# Patient Record
Sex: FEMALE | Race: WHITE | NOT HISPANIC OR LATINO | Employment: FULL TIME | URBAN - METROPOLITAN AREA
[De-identification: names, ages, dates, MRNs, and addresses within clinical notes are randomized per-mention and may not be internally consistent; named-entity substitution may affect disease eponyms.]

---

## 2023-09-07 PROBLEM — R06.02 SHORTNESS OF BREATH: Status: ACTIVE | Noted: 2023-09-07

## 2023-09-07 PROBLEM — I25.10 CORONARY ARTERIOSCLEROSIS: Status: ACTIVE | Noted: 2023-09-07

## 2023-09-07 PROBLEM — R53.81 PHYSICAL DECONDITIONING: Status: ACTIVE | Noted: 2023-09-07

## 2023-09-07 PROBLEM — I50.32 CHRONIC DIASTOLIC CONGESTIVE HEART FAILURE (MULTI): Status: ACTIVE | Noted: 2023-09-07

## 2023-09-07 PROBLEM — I50.33 ACUTE ON CHRONIC HEART FAILURE WITH PRESERVED EJECTION FRACTION (MULTI): Status: ACTIVE | Noted: 2023-09-07

## 2023-09-07 PROBLEM — R60.0 LOWER EXTREMITY EDEMA: Status: ACTIVE | Noted: 2023-09-07

## 2023-09-07 PROBLEM — I27.81 COR PULMONALE (MULTI): Status: ACTIVE | Noted: 2023-09-07

## 2023-09-07 PROBLEM — F10.10 ALCOHOL ABUSE: Status: ACTIVE | Noted: 2023-09-07

## 2023-09-07 PROBLEM — J96.11 CHRONIC RESPIRATORY FAILURE WITH HYPOXIA AND HYPERCAPNIA (MULTI): Status: ACTIVE | Noted: 2023-09-07

## 2023-09-07 PROBLEM — D64.9 ANEMIA: Status: ACTIVE | Noted: 2023-09-07

## 2023-09-07 PROBLEM — R60.0 PERIORBITAL EDEMA: Status: ACTIVE | Noted: 2023-09-07

## 2023-09-07 PROBLEM — J44.1 COPD EXACERBATION (MULTI): Status: ACTIVE | Noted: 2023-09-07

## 2023-09-07 PROBLEM — D75.89 MACROCYTOSIS WITHOUT ANEMIA: Status: ACTIVE | Noted: 2023-09-07

## 2023-09-07 PROBLEM — D69.6 THROMBOCYTOPENIA (CMS-HCC): Status: ACTIVE | Noted: 2023-09-07

## 2023-09-07 PROBLEM — J20.9 ACUTE BRONCHITIS WITH BRONCHOSPASM: Status: ACTIVE | Noted: 2023-09-07

## 2023-09-07 PROBLEM — R25.1 TREMOR, UNSPECIFIED: Status: ACTIVE | Noted: 2023-09-07

## 2023-09-07 PROBLEM — I27.20 PULMONARY HYPERTENSION, MILD (MULTI): Status: ACTIVE | Noted: 2023-09-07

## 2023-09-07 PROBLEM — E78.5 HYPERLIPIDEMIA: Status: ACTIVE | Noted: 2023-09-07

## 2023-09-07 PROBLEM — R09.89 LABILE HYPERTENSION: Status: ACTIVE | Noted: 2023-09-07

## 2023-09-07 PROBLEM — J96.12 CHRONIC RESPIRATORY FAILURE WITH HYPOXIA AND HYPERCAPNIA (MULTI): Status: ACTIVE | Noted: 2023-09-07

## 2023-09-07 RX ORDER — ACETAMINOPHEN 325 MG/1
TABLET ORAL EVERY 4 HOURS PRN
COMMUNITY
Start: 2019-05-14 | End: 2023-11-21 | Stop reason: ALTCHOICE

## 2023-09-07 RX ORDER — ATORVASTATIN CALCIUM 40 MG/1
TABLET, FILM COATED ORAL
COMMUNITY
Start: 2020-02-13 | End: 2023-10-24 | Stop reason: SDUPTHER

## 2023-09-07 RX ORDER — UMECLIDINIUM BROMIDE AND VILANTEROL TRIFENATATE 62.5; 25 UG/1; UG/1
1 POWDER RESPIRATORY (INHALATION) DAILY
COMMUNITY
Start: 2021-01-07 | End: 2023-11-13 | Stop reason: SDUPTHER

## 2023-09-07 RX ORDER — CARVEDILOL 6.25 MG/1
1 TABLET ORAL 2 TIMES DAILY
COMMUNITY
Start: 2020-02-05 | End: 2023-12-11 | Stop reason: SDUPTHER

## 2023-09-07 RX ORDER — IBUPROFEN 200 MG
TABLET ORAL
COMMUNITY
End: 2023-11-21 | Stop reason: ALTCHOICE

## 2023-09-07 RX ORDER — FERROUS SULFATE 324(65)MG
65 TABLET, DELAYED RELEASE (ENTERIC COATED) ORAL
COMMUNITY
Start: 2023-03-24

## 2023-09-07 RX ORDER — ALBUTEROL SULFATE 90 UG/1
2 AEROSOL, METERED RESPIRATORY (INHALATION) EVERY 4 HOURS PRN
COMMUNITY
Start: 2019-04-05 | End: 2024-05-24 | Stop reason: SDUPTHER

## 2023-10-11 ENCOUNTER — APPOINTMENT (OUTPATIENT)
Dept: CARDIOLOGY | Facility: HOSPITAL | Age: 63
End: 2023-10-11
Payer: COMMERCIAL

## 2023-10-24 DIAGNOSIS — E78.5 HYPERLIPIDEMIA, UNSPECIFIED HYPERLIPIDEMIA TYPE: Primary | ICD-10-CM

## 2023-10-24 RX ORDER — ATORVASTATIN CALCIUM 40 MG/1
40 TABLET, FILM COATED ORAL DAILY
Qty: 90 TABLET | Refills: 3 | Status: SHIPPED | OUTPATIENT
Start: 2023-10-24

## 2023-11-13 ENCOUNTER — OFFICE VISIT (OUTPATIENT)
Dept: PULMONOLOGY | Facility: CLINIC | Age: 63
End: 2023-11-13
Payer: COMMERCIAL

## 2023-11-13 VITALS
TEMPERATURE: 97 F | DIASTOLIC BLOOD PRESSURE: 71 MMHG | RESPIRATION RATE: 18 BRPM | OXYGEN SATURATION: 97 % | HEART RATE: 78 BPM | BODY MASS INDEX: 24.27 KG/M2 | WEIGHT: 151 LBS | HEIGHT: 66 IN | SYSTOLIC BLOOD PRESSURE: 123 MMHG

## 2023-11-13 DIAGNOSIS — J96.11 CHRONIC RESPIRATORY FAILURE WITH HYPOXIA AND HYPERCAPNIA (MULTI): Primary | ICD-10-CM

## 2023-11-13 DIAGNOSIS — I27.20 PULMONARY HYPERTENSION, MILD (MULTI): ICD-10-CM

## 2023-11-13 DIAGNOSIS — F17.219 CIGARETTE NICOTINE DEPENDENCE WITH NICOTINE-INDUCED DISORDER: ICD-10-CM

## 2023-11-13 DIAGNOSIS — J44.1 COPD EXACERBATION (MULTI): ICD-10-CM

## 2023-11-13 DIAGNOSIS — J96.12 CHRONIC RESPIRATORY FAILURE WITH HYPOXIA AND HYPERCAPNIA (MULTI): Primary | ICD-10-CM

## 2023-11-13 PROCEDURE — 3078F DIAST BP <80 MM HG: CPT | Performed by: INTERNAL MEDICINE

## 2023-11-13 PROCEDURE — 99214 OFFICE O/P EST MOD 30 MIN: CPT | Performed by: INTERNAL MEDICINE

## 2023-11-13 PROCEDURE — 3074F SYST BP LT 130 MM HG: CPT | Performed by: INTERNAL MEDICINE

## 2023-11-13 PROCEDURE — 1036F TOBACCO NON-USER: CPT | Performed by: INTERNAL MEDICINE

## 2023-11-13 RX ORDER — UMECLIDINIUM BROMIDE AND VILANTEROL TRIFENATATE 62.5; 25 UG/1; UG/1
1 POWDER RESPIRATORY (INHALATION) DAILY
Qty: 30 EACH | Refills: 11 | Status: SHIPPED | OUTPATIENT
Start: 2023-11-13 | End: 2024-05-24 | Stop reason: SDUPTHER

## 2023-11-13 ASSESSMENT — ENCOUNTER SYMPTOMS
FREQUENCY: 0
FATIGUE: 1
SHORTNESS OF BREATH: 1
COUGH: 1
EYE PAIN: 0
SINUS PAIN: 0
UNEXPECTED WEIGHT CHANGE: 0
DIARRHEA: 0
PALPITATIONS: 0
FEVER: 0
ABDOMINAL PAIN: 0
EYE REDNESS: 0
VOMITING: 0
NERVOUS/ANXIOUS: 0
BACK PAIN: 0
RHINORRHEA: 1
HEMATURIA: 0
NAUSEA: 0
EYE ITCHING: 0
DIZZINESS: 0
CONSTIPATION: 1
CONFUSION: 0
NECK PAIN: 0
ARTHRALGIAS: 0
APPETITE CHANGE: 0
DYSPHORIC MOOD: 0
LIGHT-HEADEDNESS: 0
SORE THROAT: 0
WOUND: 0
SEIZURES: 0
HEADACHES: 0
DYSURIA: 0
CHILLS: 0
MYALGIAS: 0
CHEST TIGHTNESS: 0
SINUS PRESSURE: 0

## 2023-11-13 ASSESSMENT — PAIN SCALES - GENERAL: PAINLEVEL: 0-NO PAIN

## 2023-11-13 NOTE — PROGRESS NOTES
Subjective   Patient ID: Ilsa Cosby is a 63 y.o. female who presents for Follow-up and COPD.  HPI  63 YOF with h/o COPD and CHF being seen for COPD management.   Last seen 8 months ago, no major events since then.   Overall feels the same as the last visit.   In the past, stated her employer is not allowing her to use her portable O2 at work due to safety issues. As a result is experiencing significant desaturation at work. But now she is not working. She at times increases her supplemental O2 to 2-4 LPM due to low O2 saturations.   Denies SOB at rest. ARECHIGA after walking 100 feet, or climbing 1 FOS. +ve rare orthopnea. Occasionally +ve PND. No LE edema. ARECHIGA gradual onset, over years, progressive, but now stable. Denies associated CP, wheezing.   Very rare wheezing.  +ve chronic cough, mild, productive of clear sputum. No h/o hemoptysis. Cough and sputum now improved/stable     +ve snoring, no witness apnea. Does not feels rested in am.     Exposures: quit smoking in 10/2023, 1/20 PPD (cutting back) 1 pack/day x 30 years h/o smoking. No other exposures.      Review of Systems   Constitutional:  Positive for fatigue. Negative for appetite change, chills, fever and unexpected weight change.   HENT:  Positive for congestion, postnasal drip and rhinorrhea. Negative for sinus pressure, sinus pain and sore throat.    Eyes:  Negative for pain, redness, itching and visual disturbance.   Respiratory:  Positive for cough and shortness of breath. Negative for chest tightness.    Cardiovascular:  Negative for chest pain, palpitations and leg swelling.   Gastrointestinal:  Positive for constipation. Negative for abdominal pain, diarrhea, nausea and vomiting.   Genitourinary:  Negative for dysuria, frequency and hematuria.   Musculoskeletal:  Negative for arthralgias, back pain, myalgias and neck pain.   Skin:  Negative for pallor, rash and wound.   Neurological:  Negative for dizziness, seizures, syncope, light-headedness and  headaches.   Psychiatric/Behavioral:  Negative for confusion and dysphoric mood. The patient is not nervous/anxious.        Objective   Physical Exam  Constitutional:       General: She is not in acute distress.     Appearance: Normal appearance. She is normal weight. She is not ill-appearing.   HENT:      Head: Normocephalic and atraumatic.      Nose:      Comments: On NC.      Mouth/Throat:      Mouth: Mucous membranes are moist.      Comments: Mallampati 3.   Eyes:      General: No scleral icterus.     Extraocular Movements: Extraocular movements intact.      Pupils: Pupils are equal, round, and reactive to light.   Cardiovascular:      Rate and Rhythm: Normal rate and regular rhythm.      Heart sounds: Normal heart sounds. No murmur heard.     No friction rub. No gallop.   Pulmonary:      Effort: Pulmonary effort is normal. No respiratory distress.      Breath sounds: No wheezing or rales.      Comments: Clear lung fields b/l with fair/poor air entry.   Abdominal:      General: Bowel sounds are normal. There is no distension.      Palpations: Abdomen is soft.      Tenderness: There is no abdominal tenderness.   Musculoskeletal:         General: No swelling. Normal range of motion.      Cervical back: Neck supple. No rigidity or tenderness.      Right lower leg: No edema.      Left lower leg: No edema.   Lymphadenopathy:      Cervical: No cervical adenopathy.   Skin:     General: Skin is warm and dry.      Coloration: Skin is not jaundiced.   Neurological:      General: No focal deficit present.      Mental Status: She is alert and oriented to person, place, and time.      Cranial Nerves: No cranial nerve deficit.      Motor: No weakness.   Psychiatric:         Mood and Affect: Mood normal.         Behavior: Behavior normal.     Results/Data  CAT from today reviewed, today's score 19.   6 MWT result from 5/2023 reviewed. Walked 262 m (51%), required 4 L both at rest and with activity.      The followings reviewed  during the previous visits.  I personally reviewed the images for the chest X-ray from 6/9/2021, official read as below.   Echo result from 3/2021 reviewed that showed: HFpEF, normal RV and RVSF, RVSP 67. +ve PFO.   I personally reviewed the images for the chest X-ray from 11/2020 that showed small b/l pleural effusion.  Echo result from Sep 2019 reviewed that showed HFpEF, enlarged RV, normal RVSF.   I personally reviewed the images for the chest CT from 5/10/19 that showed b/l effusion, R>L  Echo from 5/9/ also reviewed that showed RVSP 40, severely dilated RV.   I personally interpreted the PFT from 4/2019 that showed FEV1/FVC 51%, FEV1 27%, RV/TLC 79%, decreased DLCO.   Assessment/Plan   62 YOF with recently diagnosed COPD, hypoxia, CHF now being seen for follow up.      1. COPD: GOLD 4, category B. CAT 19. No recent hospitalization. Symptoms improved with Anoro. Now slightly worse due to not having another for a month.    -continue Anoro   -Continue ProAir as needed, on average twice a week.    -Went to pulmonary rehab. Advised to continue the exercises.      2. Hypoxia: qualified for home O2. Currently on 4L. +ve PFO on Echo from 3/2021. Recent 6 MWT confirmed need for 4L   -advised to use her O2 all the time     3. Pulmonary HTN and core pulmonale: likely group 2 and 3 from L sided HF and hypoxia. Worsening RVSP, but RV normal size.    -Treat COPD and hypoxia as above   -CHF management as per cardiology     4. Smoking: quit a month ago.    -not interested to have screening yearly chest CT done.    -smoking cessation counseling done, total time 4 min   -continue Nicotine products if needed     RTC in 6 months.

## 2023-11-13 NOTE — PATIENT INSTRUCTIONS
Ms Cosby,      It was a pleasure to see you in the clinic today. We discussed the followings:     1. COPD: your breathing test showed that you have a very severe case of COPD. Your symptoms seems to have improved with Anoro. Please continue Anoro and ProAir as needed.      2. Hypoxia: please continue to use your oxygen at all times.     3. Smoking: please make every effort to stop smoking.      Please return to the clinic in 6 months.

## 2023-11-17 PROBLEM — S32.423A: Status: ACTIVE | Noted: 2023-01-25

## 2023-11-17 PROBLEM — N39.0 URINARY TRACT INFECTION, SITE NOT SPECIFIED: Status: ACTIVE | Noted: 2023-01-31

## 2023-11-17 PROBLEM — M89.8X8 OTHER SPECIFIED DISORDERS OF BONE, OTHER SITE: Status: ACTIVE | Noted: 2023-01-31

## 2023-11-17 PROBLEM — S52.501A CLOSED FRACTURE OF DISTAL END OF RIGHT RADIUS: Status: ACTIVE | Noted: 2023-01-31

## 2023-11-17 PROBLEM — S32.591A PUBIC RAMUS FRACTURE, RIGHT, CLOSED, INITIAL ENCOUNTER (MULTI): Status: ACTIVE | Noted: 2023-01-31

## 2023-11-17 PROBLEM — I50.9 HEART FAILURE, UNSPECIFIED (MULTI): Status: ACTIVE | Noted: 2023-01-31

## 2023-11-17 PROBLEM — I10 ESSENTIAL (PRIMARY) HYPERTENSION: Status: ACTIVE | Noted: 2023-01-31

## 2023-11-17 PROBLEM — S32.591D: Status: ACTIVE | Noted: 2023-01-31
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
## 2023-11-17 PROBLEM — S52.611D: Status: ACTIVE | Noted: 2023-01-31

## 2023-11-17 PROBLEM — S32.401D: Status: ACTIVE | Noted: 2023-01-31

## 2023-11-17 PROBLEM — K59.00 CONSTIPATION, UNSPECIFIED: Status: ACTIVE | Noted: 2023-01-31

## 2023-11-20 ENCOUNTER — APPOINTMENT (OUTPATIENT)
Dept: PULMONOLOGY | Facility: CLINIC | Age: 63
End: 2023-11-20
Payer: COMMERCIAL

## 2023-11-21 ENCOUNTER — OFFICE VISIT (OUTPATIENT)
Dept: CARDIOLOGY | Facility: HOSPITAL | Age: 63
End: 2023-11-21
Payer: COMMERCIAL

## 2023-11-21 VITALS
DIASTOLIC BLOOD PRESSURE: 71 MMHG | WEIGHT: 150 LBS | HEIGHT: 66 IN | SYSTOLIC BLOOD PRESSURE: 125 MMHG | BODY MASS INDEX: 24.11 KG/M2 | OXYGEN SATURATION: 96 % | HEART RATE: 75 BPM

## 2023-11-21 DIAGNOSIS — R06.09 DYSPNEA ON EXERTION: ICD-10-CM

## 2023-11-21 DIAGNOSIS — E78.5 HYPERLIPIDEMIA, UNSPECIFIED HYPERLIPIDEMIA TYPE: ICD-10-CM

## 2023-11-21 DIAGNOSIS — I25.10 CORONARY ARTERY DISEASE, UNSPECIFIED VESSEL OR LESION TYPE, UNSPECIFIED WHETHER ANGINA PRESENT, UNSPECIFIED WHETHER NATIVE OR TRANSPLANTED HEART: Primary | ICD-10-CM

## 2023-11-21 PROCEDURE — 3074F SYST BP LT 130 MM HG: CPT | Performed by: NURSE PRACTITIONER

## 2023-11-21 PROCEDURE — 3078F DIAST BP <80 MM HG: CPT | Performed by: NURSE PRACTITIONER

## 2023-11-21 PROCEDURE — 93010 ELECTROCARDIOGRAM REPORT: CPT | Performed by: INTERNAL MEDICINE

## 2023-11-21 PROCEDURE — 99214 OFFICE O/P EST MOD 30 MIN: CPT | Performed by: NURSE PRACTITIONER

## 2023-11-21 PROCEDURE — 93005 ELECTROCARDIOGRAM TRACING: CPT | Performed by: NURSE PRACTITIONER

## 2023-11-21 PROCEDURE — 1036F TOBACCO NON-USER: CPT | Performed by: NURSE PRACTITIONER

## 2023-11-21 NOTE — PROGRESS NOTES
Primary Care Physician: Karlene Valenzuela MD  Date of Visit: 11/21/2023 10:30 AM EST  Location of visit: [facility]     Chief Complaint:   Chief Complaint   Patient presents with    Heart Failure    Hyperlipidemia    Coronary Artery Disease    Follow-up        HPI / Summary:   Ilsa Cosby is a 63 y.o. female presents for followup. Seen in collaboration with Dr. Barros.   Her chronic dyspnea on exertion has been worse since March. She has not been physically active. She is able to ambulate up and down one flight of stairs without chest pain. She wear supplemental oxygen 4 liters nasal cannula all the time. She continues to not smoke.   The patient denies chest pain, palpitations, lightheadedness, syncope, orthopnea, paroxysmal nocturnal dyspnea, lower extremity edema, or bleeding problems.              Past Medical History:  Past Medical History:   Diagnosis Date    Acute on chronic diastolic (congestive) heart failure (CMS/HCC) 05/23/2019    Acute on chronic heart failure with preserved ejection fraction    Chronic obstructive pulmonary disease with (acute) exacerbation (CMS/[facility]) 11/24/2020    COPD exacerbation    Encounter for screening for other viral diseases 03/05/2019    Need for hepatitis C screening test    Localized edema 05/08/2019    Lower extremity edema    Localized edema 03/05/2019    Periorbital edema    Other malaise 11/18/2021    Physical deconditioning    Personal history of other diseases of the circulatory system 06/03/2019    History of congestive heart failure    Personal history of other diseases of the nervous system and sense organs 03/05/2019    History of tremor    Personal history of other diseases of the respiratory system 06/03/2019    History of chronic obstructive lung disease    Personal history of other diseases of the respiratory system 08/14/2020    History of acute bronchitis with bronchospasm        Past Surgical History:  Past Surgical History:   Procedure Laterality Date     "OTHER SURGICAL HISTORY  06/03/2019    No history of surgery          Social History:   reports that she has quit smoking. Her smoking use included cigarettes. She has never used smokeless tobacco. She reports current alcohol use of about 2.0 standard drinks of alcohol per week. She reports that she does not use drugs.     Family History:  family history includes Diabetes in her paternal grandfather; Myocardial infarction in her brother and father; Stroke in her maternal grandmother, mother's brother, and mother's sister; cardiac disorder in her father.      Allergies:  No Known Allergies    Outpatient Medications:  Current Outpatient Medications   Medication Instructions    albuterol 90 mcg/actuation inhaler 2 puffs, inhalation, Every 4 hours PRN    atorvastatin (LIPITOR) 40 mg, oral, Daily    carvedilol (Coreg) 6.25 mg tablet 1 tablet, oral, 2 times daily    ferrous sulfate 65 mg, oral, Daily with breakfast    umeclidinium-vilanteroL (Anoro Ellipta) 62.5-25 mcg/actuation blister with device 1 puff, inhalation, Daily       Physical Exam:  Vitals:    11/21/23 1030   BP: 125/71   BP Location: Left arm   Patient Position: Sitting   BP Cuff Size: Small adult   Pulse: 75   SpO2: 96%   Weight: 68 kg (150 lb)   Height: 1.676 m (5' 6\")     Wt Readings from Last 5 Encounters:   11/21/23 68 kg (150 lb)   11/13/23 68.5 kg (151 lb)   03/24/23 63 kg (139 lb)   01/12/23 64 kg (141 lb 2 oz)   10/13/22 61.2 kg (135 lb 0.4 oz)     Body mass index is 24.21 kg/m².     GENERAL: alert, cooperative, pleasant, in no acute distress  SKIN: warm and dry  NECK: Normal JVD, negative HJR  CARDIAC: Regular rate and rhythm with no rubs, murmurs, or gallops  CHEST: Normal respiratory efforts, except diminished lung sounds posteriorly throughout all lung fields.  ABDOMEN: soft, nontender, nondistended  EXTREMITIES: no edema, +2 palpable RP bilaterally       Last Labs:  Recent Labs     01/12/23  1524 01/11/23  1319 06/09/21  1751   WBC 7.3 7.8 5.8 "   HGB 10.0* 10.2* 13.6   HCT 32.1* 32.0* 43.1    232 126*   * 110* 112*     Recent Labs     01/11/23  1319 06/09/21  1751 11/04/20  0317 02/05/20  1356    142 139 140   K 4.8 4.1 4.9 4.0   CL 97* 99 99 103   BUN 25* 16  --  14   CREATININE 0.80 0.64 0.62 0.67     CMP -  Lab Results   Component Value Date    CALCIUM 9.9 01/11/2023    PHOS 4.7 05/10/2019    PROT 7.4 01/11/2023    ALBUMIN 4.5 01/11/2023    AST 23 01/11/2023    ALT 15 01/11/2023    ALKPHOS 83 01/11/2023    BILITOT 0.4 01/11/2023       LIPID PANEL -   Lab Results   Component Value Date    CHOL 166 01/11/2023    HDL 89.3 01/11/2023    LDLF 63 01/11/2023    TRIG 69 01/11/2023       Lab Results   Component Value Date     (H) 06/09/2021    HGBA1C 5.1 01/11/2023       Last Cardiology Tests:  ECG:  Obtained and reviewed EKG- normal sinus rhythm with occasional PVC, rightward axis HR 75    Echo:  Echo Results:  3/16/21  CONCLUSIONS:   1. The left ventricular systolic function is normal with a 65-70% estimated ejection fraction.   2. Spectral Doppler shows an impaired relaxation pattern of left ventricular diastolic filling.   3. Moderately elevated pulmonary artery pressure.   4. A bubble study using agitated saline was performed. Bubble study is positive.         Cath:  5/13/2019  CONCLUSIONS:   1. No significant CAD with minimal luminal irregularities in a left dominant system.   2. Mildly elevated right sided filling pressures.   3. Normal left sided filling pressures.   4. Mild pulmonary hypertension.   5. No evidence of intracardiac shunt.   6. Preserved cardiac index.   7. No aortic stenosis.             Assessment/Plan   Diagnoses and all orders for this visit:  Coronary artery disease, unspecified vessel or lesion type, unspecified whether angina present, unspecified whether native or transplanted heart  -     ECG 12 lead (Clinic Performed)  -     Referral to Primary Care; Future  Hyperlipidemia, unspecified hyperlipidemia  type  -     Referral to Primary Care; Future  Dyspnea on exertion  -     Transthoracic Echo (TTE) Complete; Future  -     XR chest 2 views; Future  -     Referral to Primary Care; Future     In summary Ms. Cosby is a pleasant 63 year-old female with a past medical history significant for mild coronary atherosclerosis with luminal irregularities on angiography, severe COPD with cor pulmonale and mild pulmonary hypertension on right heart catheterization, chronic tobacco use, chronic alcohol use, and a family history of premature coronary disease and sudden death. Her dyspnea on exertion is worse since March. She is euvolemic by clinical exam. Her blood pressure is controlled. I suspect her dyspnea on exertion is secondary to severe COPD. I did order a chest xray and echocardiogram for surveillance of LV function. She will continue current cardiovascular medications. She will follow up in 1 year. She will call sooner with questions or concerns.             Orders:  No orders of the defined types were placed in this encounter.     Followup Appts:  No future appointments.        ____________________________________________________________  Rebecca Valentin, APRN-CNP  [facility]  [facility]

## 2023-11-21 NOTE — PATIENT INSTRUCTIONS
Chest xray  Echo  Continue current cardiovascular medications  Follow up in 1 year  Call sooner if symptoms get worse

## 2023-12-03 LAB
ATRIAL RATE: 75 BPM
P AXIS: 85 DEGREES
P OFFSET: 210 MS
P ONSET: 160 MS
PR INTERVAL: 120 MS
Q ONSET: 220 MS
QRS COUNT: 12 BEATS
QRS DURATION: 84 MS
QT INTERVAL: 386 MS
QTC CALCULATION(BAZETT): 431 MS
QTC FREDERICIA: 415 MS
R AXIS: 90 DEGREES
T AXIS: 72 DEGREES
T OFFSET: 413 MS
VENTRICULAR RATE: 75 BPM

## 2023-12-04 ENCOUNTER — APPOINTMENT (OUTPATIENT)
Dept: CARDIOLOGY | Facility: HOSPITAL | Age: 63
End: 2023-12-04
Payer: COMMERCIAL

## 2023-12-11 DIAGNOSIS — R09.89 LABILE HYPERTENSION: Primary | ICD-10-CM

## 2023-12-11 RX ORDER — CARVEDILOL 6.25 MG/1
6.25 TABLET ORAL 2 TIMES DAILY
Qty: 180 TABLET | Refills: 3 | Status: SHIPPED | OUTPATIENT
Start: 2023-12-11 | End: 2024-04-22

## 2024-01-18 ENCOUNTER — HOSPITAL ENCOUNTER (OUTPATIENT)
Dept: CARDIOLOGY | Facility: HOSPITAL | Age: 64
Discharge: HOME | End: 2024-01-18
Payer: COMMERCIAL

## 2024-01-18 DIAGNOSIS — R06.00 DYSPNEA, UNSPECIFIED: ICD-10-CM

## 2024-01-18 DIAGNOSIS — R06.09 DYSPNEA ON EXERTION: ICD-10-CM

## 2024-01-18 LAB
AORTIC VALVE PEAK VELOCITY: 1.54 M/S
AV PEAK GRADIENT: 9.5 MMHG
AVA (PEAK VEL): 2.19 CM2
EJECTION FRACTION APICAL 4 CHAMBER: 62.7
EJECTION FRACTION: 55 %
LEFT ATRIUM VOLUME AREA LENGTH INDEX BSA: 29.8 ML/M2
LEFT VENTRICLE INTERNAL DIMENSION DIASTOLE: 3.7 CM (ref 3.5–6)
LEFT VENTRICULAR OUTFLOW TRACT DIAMETER: 1.9 CM
MITRAL VALVE E/A RATIO: 1.03
RIGHT VENTRICLE PEAK SYSTOLIC PRESSURE: 56.9 MMHG
TRICUSPID ANNULAR PLANE SYSTOLIC EXCURSION: 2.2 CM

## 2024-01-18 PROCEDURE — 93306 TTE W/DOPPLER COMPLETE: CPT

## 2024-01-18 PROCEDURE — 93306 TTE W/DOPPLER COMPLETE: CPT | Performed by: INTERNAL MEDICINE

## 2024-01-30 NOTE — RESULT ENCOUNTER NOTE
Reviewed echocardiogram results. RVSP elevated. Recommended right heart cath. Patient would like to hold off for now. She will follow up in May or June and can re discuss.

## 2024-01-31 ENCOUNTER — APPOINTMENT (OUTPATIENT)
Dept: PRIMARY CARE | Facility: CLINIC | Age: 64
End: 2024-01-31
Payer: COMMERCIAL

## 2024-03-11 ENCOUNTER — APPOINTMENT (OUTPATIENT)
Dept: PRIMARY CARE | Facility: CLINIC | Age: 64
End: 2024-03-11
Payer: COMMERCIAL

## 2024-04-21 DIAGNOSIS — R09.89 LABILE HYPERTENSION: ICD-10-CM

## 2024-04-22 RX ORDER — CARVEDILOL 6.25 MG/1
6.25 TABLET ORAL 2 TIMES DAILY
Qty: 180 TABLET | Refills: 3 | Status: SHIPPED | OUTPATIENT
Start: 2024-04-22

## 2024-05-01 ENCOUNTER — OFFICE VISIT (OUTPATIENT)
Dept: PRIMARY CARE | Facility: CLINIC | Age: 64
End: 2024-05-01
Payer: COMMERCIAL

## 2024-05-01 DIAGNOSIS — Z12.31 ENCOUNTER FOR SCREENING MAMMOGRAM FOR MALIGNANT NEOPLASM OF BREAST: ICD-10-CM

## 2024-05-01 DIAGNOSIS — I27.20 PULMONARY HYPERTENSION, MILD (MULTI): ICD-10-CM

## 2024-05-01 DIAGNOSIS — Z00.00 PHYSICAL EXAM: Primary | ICD-10-CM

## 2024-05-01 DIAGNOSIS — Z23 NEED FOR VACCINATION FOR ZOSTER: ICD-10-CM

## 2024-05-01 DIAGNOSIS — J96.12 CHRONIC RESPIRATORY FAILURE WITH HYPOXIA AND HYPERCAPNIA (MULTI): ICD-10-CM

## 2024-05-01 DIAGNOSIS — J96.11 CHRONIC RESPIRATORY FAILURE WITH HYPOXIA AND HYPERCAPNIA (MULTI): ICD-10-CM

## 2024-05-01 PROCEDURE — 99396 PREV VISIT EST AGE 40-64: CPT | Performed by: INTERNAL MEDICINE

## 2024-05-01 PROCEDURE — 3075F SYST BP GE 130 - 139MM HG: CPT | Performed by: INTERNAL MEDICINE

## 2024-05-01 PROCEDURE — 90750 HZV VACC RECOMBINANT IM: CPT | Performed by: INTERNAL MEDICINE

## 2024-05-01 PROCEDURE — 3078F DIAST BP <80 MM HG: CPT | Performed by: INTERNAL MEDICINE

## 2024-05-01 PROCEDURE — 90471 IMMUNIZATION ADMIN: CPT | Performed by: INTERNAL MEDICINE

## 2024-05-02 VITALS
TEMPERATURE: 98 F | OXYGEN SATURATION: 92 % | SYSTOLIC BLOOD PRESSURE: 138 MMHG | WEIGHT: 159 LBS | HEART RATE: 86 BPM | HEIGHT: 66 IN | BODY MASS INDEX: 25.55 KG/M2 | DIASTOLIC BLOOD PRESSURE: 70 MMHG

## 2024-05-02 PROBLEM — J44.1 COPD EXACERBATION (MULTI): Status: RESOLVED | Noted: 2023-09-07 | Resolved: 2024-05-02

## 2024-05-02 NOTE — PROGRESS NOTES
"SUBJECTIVE:   Ilsa Cosby is a 63 y.o. female presenting for her annual physical/wellness.  PCP: Karlene Valenzuela MD    Colon screening: she would like to discuss after turning 64, when she has insurance that covers for tests.  Last pap: due. Need to see gyn  Last mammogram: due  Dexa she prefers to discuss these once she has better insurance, later this year  Vaccines due for shingrix  Diet:   healthy in general  Exercises: not much  Lab Results   Component Value Date    HGBA1C 5.1 01/11/2023     Lab Results   Component Value Date    CREATININE 0.80 01/11/2023     Lab Results   Component Value Date    WBC 7.3 01/12/2023    HGB 10.0 (L) 01/12/2023    HCT 32.1 (L) 01/12/2023     (H) 01/12/2023     01/12/2023     Lab Results   Component Value Date    CHOL 166 01/11/2023    CHOL 146 10/05/2021    CHOL 170 09/16/2020     Lab Results   Component Value Date    HDL 89.3 01/11/2023    HDL 66.1 10/05/2021    HDL 82.8 09/16/2020     No results found for: \"LDLCALC\"  Lab Results   Component Value Date    TRIG 69 01/11/2023    TRIG 63 10/05/2021    TRIG 65 09/16/2020     No components found for: \"CHOLHDL\"       ROS:   otherwise Feeling well. No dyspnea or chest pain on exertion. No abdominal pain, change in bowel habits, black or bloody stools. No urinary tract or  symptoms.  No breast concerns. No neurological complaints.    OBJECTIVE:   The patient appears well, alert, oriented x 3, in no distress.   /70   Pulse 86   Temp 36.7 °C (98 °F)   Ht 1.676 m (5' 6\")   Wt 72.1 kg (159 lb)   SpO2 92% Comment: 4 LITER OF O2  BMI 25.66 kg/m²   ENT normal.  Neck supple. No adenopathy or thyromegaly. VANESSA. Lungs are clear at this time. And somewhat decreased air entry, no wheezes, rhonchi or rales. S1 and S2 normal, no murmurs, regular rate and rhythm. Abdomen is soft without tenderness, guarding, mass or organomegaly.  exam deferred to Gyn. Extremities show no edema, normal peripheral pulses. Neurological is " normal without focal findings.      1. Physical exam  CBC, Comprehensive Metabolic Panel, Lipid Panel, Hemoglobin A1C      2. Encounter for screening mammogram for malignant neoplasm of breast  BI mammo bilateral screening tomosynthesis      3. Need for vaccination for zoster  Zoster vaccine, recombinant, adult (SHINGRIX)      4. Pulmonary hypertension, mild (Multi)        5. Chronic respiratory failure with hypoxia and hypercapnia (Multi)

## 2024-05-24 ENCOUNTER — APPOINTMENT (OUTPATIENT)
Dept: PULMONOLOGY | Facility: CLINIC | Age: 64
End: 2024-05-24
Payer: COMMERCIAL

## 2024-05-24 ENCOUNTER — OFFICE VISIT (OUTPATIENT)
Dept: PULMONOLOGY | Facility: CLINIC | Age: 64
End: 2024-05-24
Payer: COMMERCIAL

## 2024-05-24 VITALS
SYSTOLIC BLOOD PRESSURE: 129 MMHG | DIASTOLIC BLOOD PRESSURE: 71 MMHG | WEIGHT: 158.4 LBS | OXYGEN SATURATION: 91 % | BODY MASS INDEX: 25.46 KG/M2 | HEART RATE: 81 BPM | HEIGHT: 66 IN | TEMPERATURE: 97.1 F

## 2024-05-24 DIAGNOSIS — I27.20 PULMONARY HYPERTENSION, MILD (MULTI): ICD-10-CM

## 2024-05-24 DIAGNOSIS — J44.1 COPD EXACERBATION (MULTI): ICD-10-CM

## 2024-05-24 DIAGNOSIS — J96.12 CHRONIC RESPIRATORY FAILURE WITH HYPOXIA AND HYPERCAPNIA (MULTI): Primary | ICD-10-CM

## 2024-05-24 DIAGNOSIS — J96.11 CHRONIC RESPIRATORY FAILURE WITH HYPOXIA AND HYPERCAPNIA (MULTI): Primary | ICD-10-CM

## 2024-05-24 DIAGNOSIS — F17.219 CIGARETTE NICOTINE DEPENDENCE WITH NICOTINE-INDUCED DISORDER: ICD-10-CM

## 2024-05-24 PROCEDURE — 1036F TOBACCO NON-USER: CPT | Performed by: INTERNAL MEDICINE

## 2024-05-24 PROCEDURE — 3074F SYST BP LT 130 MM HG: CPT | Performed by: INTERNAL MEDICINE

## 2024-05-24 PROCEDURE — 99214 OFFICE O/P EST MOD 30 MIN: CPT | Performed by: INTERNAL MEDICINE

## 2024-05-24 PROCEDURE — 3078F DIAST BP <80 MM HG: CPT | Performed by: INTERNAL MEDICINE

## 2024-05-24 RX ORDER — ALBUTEROL SULFATE 90 UG/1
2 AEROSOL, METERED RESPIRATORY (INHALATION) EVERY 4 HOURS PRN
Qty: 18 G | Refills: 3 | Status: SHIPPED | OUTPATIENT
Start: 2024-05-24

## 2024-05-24 ASSESSMENT — ENCOUNTER SYMPTOMS
HEMATURIA: 0
VOMITING: 0
HEADACHES: 0
LIGHT-HEADEDNESS: 0
FREQUENCY: 1
ABDOMINAL PAIN: 0
EYE ITCHING: 0
SORE THROAT: 0
EYE PAIN: 0
EYE REDNESS: 0
FEVER: 0
DIARRHEA: 0
UNEXPECTED WEIGHT CHANGE: 1
SEIZURES: 0
CONSTIPATION: 0
BACK PAIN: 0
DIZZINESS: 0
WHEEZING: 1
CHILLS: 0
ARTHRALGIAS: 0
NAUSEA: 0
NERVOUS/ANXIOUS: 0
FATIGUE: 1
WOUND: 0
COUGH: 1
APPETITE CHANGE: 0
SINUS PRESSURE: 0
SHORTNESS OF BREATH: 1
NECK PAIN: 0
DYSURIA: 0
CHEST TIGHTNESS: 0
CONFUSION: 1
SINUS PAIN: 0
PALPITATIONS: 0
DYSPHORIC MOOD: 0
RHINORRHEA: 1
MYALGIAS: 0

## 2024-05-24 NOTE — PATIENT INSTRUCTIONS
Ms Cosby,      It was a pleasure to see you in the clinic today. We discussed the followings:     1. COPD: your breathing test showed that you have a very severe case of COPD. Your symptoms seems to not be well controlled. For that reason I am changing your Anoro to a new inhaler called Trelegy. Please continue albuterol as needed.      2. Hypoxia: please continue to use your oxygen at all times.     3. Smoking: please make every effort to stop smoking.      Please return to the clinic in 6 months.

## 2024-05-24 NOTE — PROGRESS NOTES
Subjective   Patient ID: Ilsa Cosby is a 63 y.o. female who presents for COPD.  HPI  Patient with h/o COPD and CHF being seen for COPD management.   Last seen 6 months ago, no major events since then.   Overall feels worse than the last visit.   In the past, stated her employer is not allowing her to use her portable O2 at work due to safety issues. As a result is experiencing significant desaturation at work. But now she is not working. Currently on 4L NC.   Denies SOB at rest. ARECHIGA after walking 60 feet, or climbing 1 FOS. +ve rare orthopnea. No PND. No LE edema. ARECHIGA gradual onset, over years, progressive, and worse than the last visit. Denies associated CP, wheezing.   Very rare wheezing.  +ve chronic cough, mild, productive of clear sputum. No h/o hemoptysis. Cough and sputum now improved/stable     +ve snoring, no witness apnea. Does not feels rested in am.     Exposures: quit smoking in 10/2023, 1/20 PPD (cutting back) 1 pack/day x 30 years h/o smoking. No other exposures.   Current Outpatient Medications   Medication Instructions    albuterol 90 mcg/actuation inhaler 2 puffs, inhalation, Every 4 hours PRN    atorvastatin (LIPITOR) 40 mg, oral, Daily    carvedilol (COREG) 6.25 mg, oral, 2 times daily    ferrous sulfate 65 mg, oral, Daily with breakfast    umeclidinium-vilanteroL (Anoro Ellipta) 62.5-25 mcg/actuation blister with device 1 puff, inhalation, Daily   Review of Systems   Constitutional:  Positive for fatigue and unexpected weight change (weight gain). Negative for appetite change, chills and fever.   HENT:  Positive for congestion, postnasal drip and rhinorrhea. Negative for sinus pressure, sinus pain and sore throat.    Eyes:  Negative for pain, redness, itching and visual disturbance.   Respiratory:  Positive for cough, shortness of breath and wheezing. Negative for chest tightness.    Cardiovascular:  Negative for chest pain, palpitations and leg swelling.   Gastrointestinal:  Negative for  abdominal pain, constipation, diarrhea, nausea and vomiting.   Genitourinary:  Positive for frequency. Negative for dysuria and hematuria.   Musculoskeletal:  Negative for arthralgias, back pain, myalgias and neck pain.   Skin:  Negative for pallor, rash and wound.   Neurological:  Negative for dizziness, seizures, syncope, light-headedness and headaches.   Psychiatric/Behavioral:  Positive for confusion. Negative for dysphoric mood. The patient is not nervous/anxious.    Objective   Visit Vitals  /71   Pulse 81   Temp 36.2 °C (97.1 °F)   Physical Exam  Constitutional:       General: She is not in acute distress.     Appearance: Normal appearance. She is normal weight. She is not ill-appearing.   HENT:      Head: Normocephalic and atraumatic.      Nose:      Comments: On 4 L NC.      Mouth/Throat:      Mouth: Mucous membranes are moist.      Comments: Mallampati 3.   Eyes:      General: No scleral icterus.     Extraocular Movements: Extraocular movements intact.      Pupils: Pupils are equal, round, and reactive to light.   Cardiovascular:      Rate and Rhythm: Normal rate and regular rhythm.      Heart sounds: Normal heart sounds. No murmur heard.     No friction rub. No gallop.   Pulmonary:      Effort: Pulmonary effort is normal. No respiratory distress.      Breath sounds: No wheezing or rales.      Comments: Clear lung fields b/l with fair/poor air entry.   Abdominal:      General: Bowel sounds are normal. There is no distension.      Palpations: Abdomen is soft.      Tenderness: There is no abdominal tenderness.   Musculoskeletal:         General: No swelling. Normal range of motion.      Cervical back: Neck supple. No rigidity or tenderness.      Right lower leg: No edema.      Left lower leg: No edema.   Lymphadenopathy:      Cervical: No cervical adenopathy.   Skin:     General: Skin is warm and dry.      Coloration: Skin is not jaundiced.   Neurological:      General: No focal deficit present.       Mental Status: She is alert and oriented to person, place, and time.      Cranial Nerves: No cranial nerve deficit.      Motor: No weakness.   Psychiatric:         Mood and Affect: Mood normal.         Behavior: Behavior normal.     Results/Data  CAT from today reviewed, today's score 27.   Echo from 11/2023 reviewed that showed: HFpEF, normal RV, RVSF, RVSP 57.     The followings reviewed during the previous visits.      6 MWT result from 5/2023 reviewed. Walked 262 m (51%), required 4 L both at rest and with activity.   I personally reviewed the images for the chest X-ray from 6/9/2021, official read as below.   Echo result from 3/2021 reviewed that showed: HFpEF, normal RV and RVSF, RVSP 67. +ve PFO.   I personally reviewed the images for the chest X-ray from 11/2020 that showed small b/l pleural effusion.  Echo result from Sep 2019 reviewed that showed HFpEF, enlarged RV, normal RVSF.   I personally reviewed the images for the chest CT from 5/10/19 that showed b/l effusion, R>L  Echo from 5/9/ also reviewed that showed RVSP 40, severely dilated RV.   I personally interpreted the PFT from 4/2019 that showed FEV1/FVC 51%, FEV1 27%, RV/TLC 79%, decreased DLCO.   Assessment/Plan   63 YOF with recently diagnosed COPD, hypoxia, CHF now being seen for follow up.      1. COPD: GOLD 4, category B. CAT 27. No recent hospitalization. Symptoms improved with Anoro. Now symptoms are getting worse. ? Due to weight gain, or worsening COPD control       -Will change Anoro to Trelegy   -Continue ProAir as needed, on average 4-5 a week.    -Went to pulmonary rehab. Advised to continue the exercises.      2. Hypoxia: qualified for home O2. Currently on 4L. +ve PFO on Echo from 3/2021. Recent 6 MWT confirmed need for 4L   -advised to use her O2 all the time     3. Pulmonary HTN and core pulmonale: likely group 2 and 3 from L sided HF and hypoxia. Recent echo improved RVSP   -Treat COPD and hypoxia as above   -CHF management as per  cardiology     4. Smoking: quit 10/2023   -not interested to have screening yearly chest CT done.    -Nicotine products if needed     RTC in 6 months.

## 2024-06-05 DIAGNOSIS — J44.9 CHRONIC OBSTRUCTIVE PULMONARY DISEASE, UNSPECIFIED COPD TYPE (MULTI): Primary | ICD-10-CM

## 2024-06-05 RX ORDER — FLUTICASONE FUROATE, UMECLIDINIUM BROMIDE AND VILANTEROL TRIFENATATE 200; 62.5; 25 UG/1; UG/1; UG/1
1 POWDER RESPIRATORY (INHALATION) DAILY
Qty: 30 EACH | Refills: 11 | Status: SHIPPED | OUTPATIENT
Start: 2024-06-05 | End: 2025-06-05

## 2024-07-03 ENCOUNTER — HOSPITAL ENCOUNTER (OUTPATIENT)
Dept: RADIOLOGY | Facility: CLINIC | Age: 64
Discharge: HOME | End: 2024-07-03
Payer: COMMERCIAL

## 2024-07-03 ENCOUNTER — LAB (OUTPATIENT)
Dept: LAB | Facility: LAB | Age: 64
End: 2024-07-03
Payer: COMMERCIAL

## 2024-07-03 VITALS — WEIGHT: 160 LBS | BODY MASS INDEX: 27.31 KG/M2 | HEIGHT: 64 IN

## 2024-07-03 DIAGNOSIS — Z12.31 ENCOUNTER FOR SCREENING MAMMOGRAM FOR MALIGNANT NEOPLASM OF BREAST: ICD-10-CM

## 2024-07-03 DIAGNOSIS — Z00.00 PHYSICAL EXAM: ICD-10-CM

## 2024-07-03 LAB
ALBUMIN SERPL BCP-MCNC: 4.9 G/DL (ref 3.4–5)
ALP SERPL-CCNC: 67 U/L (ref 33–136)
ALT SERPL W P-5'-P-CCNC: 44 U/L (ref 7–45)
ANION GAP SERPL CALC-SCNC: 13 MMOL/L (ref 10–20)
AST SERPL W P-5'-P-CCNC: 47 U/L (ref 9–39)
BILIRUB SERPL-MCNC: 0.4 MG/DL (ref 0–1.2)
BUN SERPL-MCNC: 23 MG/DL (ref 6–23)
CALCIUM SERPL-MCNC: 9.6 MG/DL (ref 8.6–10.6)
CHLORIDE SERPL-SCNC: 96 MMOL/L (ref 98–107)
CHOLEST SERPL-MCNC: 186 MG/DL (ref 0–199)
CHOLESTEROL/HDL RATIO: 2
CO2 SERPL-SCNC: 38 MMOL/L (ref 21–32)
CREAT SERPL-MCNC: 0.84 MG/DL (ref 0.5–1.05)
EGFRCR SERPLBLD CKD-EPI 2021: 78 ML/MIN/1.73M*2
ERYTHROCYTE [DISTWIDTH] IN BLOOD BY AUTOMATED COUNT: 14.4 % (ref 11.5–14.5)
EST. AVERAGE GLUCOSE BLD GHB EST-MCNC: 97 MG/DL
GLUCOSE SERPL-MCNC: 96 MG/DL (ref 74–99)
HBA1C MFR BLD: 5 %
HCT VFR BLD AUTO: 32.1 % (ref 36–46)
HDLC SERPL-MCNC: 91.3 MG/DL
HGB BLD-MCNC: 10.2 G/DL (ref 12–16)
LDLC SERPL CALC-MCNC: 82 MG/DL
MCH RBC QN AUTO: 33.6 PG (ref 26–34)
MCHC RBC AUTO-ENTMCNC: 31.8 G/DL (ref 32–36)
MCV RBC AUTO: 106 FL (ref 80–100)
NON HDL CHOLESTEROL: 95 MG/DL (ref 0–149)
NRBC BLD-RTO: 0 /100 WBCS (ref 0–0)
PLATELET # BLD AUTO: 139 X10*3/UL (ref 150–450)
POTASSIUM SERPL-SCNC: 5 MMOL/L (ref 3.5–5.3)
PROT SERPL-MCNC: 7 G/DL (ref 6.4–8.2)
RBC # BLD AUTO: 3.04 X10*6/UL (ref 4–5.2)
SODIUM SERPL-SCNC: 142 MMOL/L (ref 136–145)
TRIGL SERPL-MCNC: 66 MG/DL (ref 0–149)
VLDL: 13 MG/DL (ref 0–40)
WBC # BLD AUTO: 4.7 X10*3/UL (ref 4.4–11.3)

## 2024-07-03 PROCEDURE — 80061 LIPID PANEL: CPT

## 2024-07-03 PROCEDURE — 83036 HEMOGLOBIN GLYCOSYLATED A1C: CPT

## 2024-07-03 PROCEDURE — 36415 COLL VENOUS BLD VENIPUNCTURE: CPT

## 2024-07-03 PROCEDURE — 85027 COMPLETE CBC AUTOMATED: CPT

## 2024-07-03 PROCEDURE — 77067 SCR MAMMO BI INCL CAD: CPT

## 2024-07-03 PROCEDURE — 80053 COMPREHEN METABOLIC PANEL: CPT

## 2024-07-08 ENCOUNTER — APPOINTMENT (OUTPATIENT)
Dept: PRIMARY CARE | Facility: CLINIC | Age: 64
End: 2024-07-08
Payer: COMMERCIAL

## 2024-07-08 DIAGNOSIS — Z23 NEED FOR SHINGLES VACCINE: ICD-10-CM

## 2024-07-08 PROCEDURE — 1036F TOBACCO NON-USER: CPT | Performed by: INTERNAL MEDICINE

## 2024-07-08 PROCEDURE — 90750 HZV VACC RECOMBINANT IM: CPT | Performed by: INTERNAL MEDICINE

## 2024-07-08 PROCEDURE — 90471 IMMUNIZATION ADMIN: CPT | Performed by: INTERNAL MEDICINE

## 2024-07-08 NOTE — PROGRESS NOTES
Patient came in today for _#2 SHINGRIX VACCINE . Injection was given in the_LEFT deltoid. Injection tolerated well.

## 2024-10-19 DIAGNOSIS — E78.5 HYPERLIPIDEMIA, UNSPECIFIED HYPERLIPIDEMIA TYPE: ICD-10-CM

## 2024-10-21 RX ORDER — ATORVASTATIN CALCIUM 40 MG/1
40 TABLET, FILM COATED ORAL DAILY
Qty: 90 TABLET | Refills: 3 | Status: SHIPPED | OUTPATIENT
Start: 2024-10-21 | End: 2025-10-21

## 2024-11-20 ENCOUNTER — OFFICE VISIT (OUTPATIENT)
Dept: CARDIOLOGY | Facility: HOSPITAL | Age: 64
End: 2024-11-20
Payer: MEDICARE

## 2024-11-20 VITALS
DIASTOLIC BLOOD PRESSURE: 62 MMHG | HEIGHT: 66 IN | BODY MASS INDEX: 25.92 KG/M2 | SYSTOLIC BLOOD PRESSURE: 122 MMHG | WEIGHT: 161.3 LBS | HEART RATE: 71 BPM

## 2024-11-20 DIAGNOSIS — R06.09 DYSPNEA ON EXERTION: ICD-10-CM

## 2024-11-20 DIAGNOSIS — I25.10 CORONARY ARTERIOSCLEROSIS: Primary | ICD-10-CM

## 2024-11-20 DIAGNOSIS — E78.5 HYPERLIPIDEMIA, UNSPECIFIED HYPERLIPIDEMIA TYPE: ICD-10-CM

## 2024-11-20 DIAGNOSIS — Z72.0 TOBACCO USE: ICD-10-CM

## 2024-11-20 DIAGNOSIS — I10 ESSENTIAL (PRIMARY) HYPERTENSION: ICD-10-CM

## 2024-11-20 LAB
ATRIAL RATE: 71 BPM
P AXIS: 73 DEGREES
P OFFSET: 207 MS
P ONSET: 155 MS
PR INTERVAL: 130 MS
Q ONSET: 220 MS
QRS COUNT: 12 BEATS
QRS DURATION: 82 MS
QT INTERVAL: 400 MS
QTC CALCULATION(BAZETT): 434 MS
QTC FREDERICIA: 423 MS
R AXIS: 85 DEGREES
T AXIS: 71 DEGREES
T OFFSET: 420 MS
VENTRICULAR RATE: 71 BPM

## 2024-11-20 PROCEDURE — 99406 BEHAV CHNG SMOKING 3-10 MIN: CPT | Performed by: INTERNAL MEDICINE

## 2024-11-20 PROCEDURE — 1036F TOBACCO NON-USER: CPT | Performed by: INTERNAL MEDICINE

## 2024-11-20 PROCEDURE — 1159F MED LIST DOCD IN RCRD: CPT | Performed by: INTERNAL MEDICINE

## 2024-11-20 PROCEDURE — 99214 OFFICE O/P EST MOD 30 MIN: CPT | Performed by: INTERNAL MEDICINE

## 2024-11-20 PROCEDURE — 1160F RVW MEDS BY RX/DR IN RCRD: CPT | Performed by: INTERNAL MEDICINE

## 2024-11-20 PROCEDURE — 3008F BODY MASS INDEX DOCD: CPT | Performed by: INTERNAL MEDICINE

## 2024-11-20 PROCEDURE — 3074F SYST BP LT 130 MM HG: CPT | Performed by: INTERNAL MEDICINE

## 2024-11-20 PROCEDURE — 93005 ELECTROCARDIOGRAM TRACING: CPT | Performed by: INTERNAL MEDICINE

## 2024-11-20 PROCEDURE — 3078F DIAST BP <80 MM HG: CPT | Performed by: INTERNAL MEDICINE

## 2024-11-20 PROCEDURE — 99417 PROLNG OP E/M EACH 15 MIN: CPT | Performed by: INTERNAL MEDICINE

## 2024-11-20 NOTE — PROGRESS NOTES
Primary Care Physician: Karlene Valenzuela MD  Date of Visit: 11/20/2024 11:40 AM EST  Location of visit: [facility]     Chief Complaint:   Chief Complaint   Patient presents with    Follow-up        HPI / Summary:   Ilsa Cosby is a 64 y.o. female presents for followup.  She notes gradual worsening over the last year and her dyspnea on exertion.  There is some improvement since starting Trelegy and increasing her oxygen up to 5 L.  She has no chest discomfort.  She does continue to smoke.  The patient denies chest pain, palpitations, lightheadedness, syncope, orthopnea, paroxysmal nocturnal dyspnea, lower extremity edema, or bleeding problems.          Past Medical History:   Diagnosis Date    Acute on chronic diastolic (congestive) heart failure 05/23/2019    Acute on chronic heart failure with preserved ejection fraction    Chronic obstructive pulmonary disease with (acute) exacerbation (Multi) 11/24/2020    COPD exacerbation    Encounter for screening for other viral diseases 03/05/2019    Need for hepatitis C screening test    Localized edema 05/08/2019    Lower extremity edema    Localized edema 03/05/2019    Periorbital edema    Other malaise 11/18/2021    Physical deconditioning    Personal history of other diseases of the circulatory system 06/03/2019    History of congestive heart failure    Personal history of other diseases of the nervous system and sense organs 03/05/2019    History of tremor    Personal history of other diseases of the respiratory system 06/03/2019    History of chronic obstructive lung disease    Personal history of other diseases of the respiratory system 08/14/2020    History of acute bronchitis with bronchospasm        Past Surgical History:   Procedure Laterality Date    OTHER SURGICAL HISTORY  06/03/2019    No history of surgery          Social History:   reports that she has quit smoking. Her smoking use included cigarettes. She has never used smokeless tobacco. She reports  "current alcohol use of about 2.0 standard drinks of alcohol per week. She reports that she does not use drugs.     Family History:  family history includes Diabetes in her paternal grandfather; Myocardial infarction in her brother and father; Stroke in her maternal grandmother, mother's brother, and mother's sister; cardiac disorder in her father.      Allergies:  No Known Allergies    Outpatient Medications:  Current Outpatient Medications   Medication Instructions    albuterol 90 mcg/actuation inhaler 2 puffs, inhalation, Every 4 hours PRN    atorvastatin (LIPITOR) 40 mg, oral, Daily    carvedilol (COREG) 6.25 mg, oral, 2 times daily    ferrous sulfate 65 mg, Daily with breakfast    fluticasone-umeclidin-vilanter (Trelegy Ellipta) 200-62.5-25 mcg blister with device 1 puff, inhalation, Daily       Physical Exam:  Vitals:    11/20/24 1148   BP: 122/62   BP Location: Left arm   Patient Position: Sitting   BP Cuff Size: Adult   Pulse: 71   Weight: 73.2 kg (161 lb 4.8 oz)   Height: 1.676 m (5' 6\")     Wt Readings from Last 5 Encounters:   11/20/24 73.2 kg (161 lb 4.8 oz)   07/03/24 72.6 kg (160 lb)   05/24/24 71.8 kg (158 lb 6.4 oz)   05/01/24 72.1 kg (159 lb)   11/21/23 68 kg (150 lb)     Body mass index is 26.03 kg/m².   General: in no acute distress  HEENT: Normocephalic atraumatic  Neck: Supple, JVP V waves to 7 cmH2O flattens with inspiration negative hepatojugular reflux 2+ carotid pulses without bruit  Pulmonary: Normal respiratory effort, decreased breath sounds bilaterally  Cardiovascular: No right ventricular heave, normal S1 and S2, no murmurs rubs or gallops  Abdomen: Soft nontender nondistended  Extremities: Warm without edema 2+ radial pulses bilaterally   Neurologic: Alert and oriented x3  Psychiatric: Normal mood and affect     Last Labs:  CMP:  Recent Labs     07/03/24  1451 01/11/23  1319 06/09/21  1751    143 142   K 5.0 4.8 4.1   CL 96* 97* 99   CO2 38* 38* 37*   ANIONGAP 13 13 10   BUN 23 " 25* 16   CREATININE 0.84 0.80 0.64   EGFR 78  --   --    GLUCOSE 96 91 83     Recent Labs     07/03/24  1451 01/11/23  1319 06/09/21  1751   ALBUMIN 4.9 4.5 4.3   ALKPHOS 67 83 72   ALT 44 15 24   AST 47* 23 31   BILITOT 0.4 0.4 0.8     CBC:  Recent Labs     07/03/24  1451 01/12/23  1524 01/11/23  1319   WBC 4.7 7.3 7.8   HGB 10.2* 10.0* 10.2*   HCT 32.1* 32.1* 32.0*   * 206 232   * 108* 110*     COAG:   Recent Labs     05/08/19  1448   INR 1.0     HEME/ENDO:  Recent Labs     07/03/24  1451 01/12/23  1524 01/11/23  1319 05/08/19  1448 03/05/19  1320   FERRITIN  --  255*  --   --   --    IRONSAT  --  18*  --   --   --    TSH  --   --   --  1.16 0.67   HGBA1C 5.0  --  5.1  --   --       CARDIAC:   Recent Labs     06/09/21  1751 11/04/20  0317 05/08/19  1448   * 111* 2,719*     Recent Labs     07/03/24  1451 01/11/23  1319 10/05/21  1219 09/16/20  1306   CHOL 186 166 146 170   LDLF  --  63 67 74   LDLCALC 82  --   --   --    HDL 91.3 89.3 66.1 82.8   TRIG 66 69 63 65       Last Cardiology Tests:  ECG:  Electrocardiogram performed today reviewed shows normal sinus rhythm with a PAC.    Echo:  Echo Results:  Transthoracic Echo (TTE) Complete 01/18/2024    [facility], [address] [place] [address]  Tel [phone] and Fax [phone]    TRANSTHORACIC ECHOCARDIOGRAM REPORT      Patient Name:      ANGELA Beebe Physician:    [id number] Jewel Barros MD  Study Date:        1/18/2024            Ordering Provider:    [id number] NONI CHAVIRA  MRN/PID:           [id number]             Fellow:  Accession#:        [id number]         Nurse:  Date of Birth/Age: 1960 / 63      Sonographer:          Jose De Jesus Garcia RDCS  years  Gender:            F                    Additional Staff:  Height:            167.64 cm            Admit Date:  Weight:            68.04 kg             Admission Status:     Outpatient  BSA:               1.77 m2              Encounter#:            [id number]  Department Location:  [facility]  Blood Pressure: 155 /71 mmHg    Study Type:    TRANSTHORACIC ECHO (TTE) COMPLETE  Diagnosis/ICD: Dyspnea, unspecified-R06.00  Indication:    Dyspnea  CPT Code:      Echo Complete w Full [id number]    Patient History:  Pertinent History: CHF.    Study Detail: The following Echo studies were performed: 2D, M-Mode, Doppler and  color flow.      PHYSICIAN INTERPRETATION:  Left Ventricle: The left ventricular systolic function is normal, with an estimated ejection fraction of 60-65%. The left ventricular cavity size is decreased. The interventricular septum is flattened in systole, consistent with right ventricular pressure overload. Left ventricular diastolic filling was indeterminate.  Left Atrium: The left atrium is normal in size. A bubble study using agitated saline was performed. Bubble study is negative.  Right Ventricle: The right ventricle is normal in size. There is normal right ventricular global systolic function.  Right Atrium: The right atrium is normal in size.  Aortic Valve: The aortic valve was not well visualized. There is trivial aortic valve regurgitation. The peak instantaneous gradient of the aortic valve is 9.5 mmHg.  Mitral Valve: The mitral valve is normal in structure. There is no evidence of mitral valve regurgitation.  Tricuspid Valve: The tricuspid valve is structurally normal. There is mild tricuspid regurgitation. The Doppler estimated RVSP is moderately elevated at 56.9 mmHg.  Pulmonic Valve: The pulmonic valve is structurally normal. There is no indication of pulmonic valve regurgitation.  Pericardium: There is no pericardial effusion noted.  Aorta: The aortic root is normal.  In comparison to the previous echocardiogram(s): Compared with study from 3/16/2021, The RVSP is now 57mmHG (previously 67mmHg.).      CONCLUSIONS:  1. Left ventricular systolic function is normal with a 60-65% estimated ejection fraction.  2. Right  ventricular pressure overload.  3. Moderately elevated right ventricular systolic pressure.  4. Left ventricular cavity size is decreased.    QUANTITATIVE DATA SUMMARY:  2D MEASUREMENTS:  Normal Ranges:  LAs:           3.00 cm   (2.7-4.0cm)  IVSd:          0.90 cm   (0.6-1.1cm)  LVPWd:         1.00 cm   (0.6-1.1cm)  LVIDd:         3.70 cm   (3.9-5.9cm)  LVIDs:         2.20 cm  LV Mass Index: 59.1 g/m2  LV % FS        40.5 %    LA VOLUME:  Normal Ranges:  LA Vol A4C:        42.6 ml    (22+/-6mL/m2)  LA Vol A2C:        60.7 ml  LA Vol BP:         52.7 ml  LA Vol Index A4C:  24.1ml/m2  LA Vol Index A2C:  34.3 ml/m2  LA Vol Index BP:   29.8 ml/m2  LA Area A4C:       15.8 cm2  LA Area A2C:       18.2 cm2  LA Major Axis A4C: 5.0 cm  LA Major Axis A2C: 4.6 cm  LA Volume Index:   29.8 ml/m2    AORTA MEASUREMENTS:  Normal Ranges:  Asc Ao, d: 2.70 cm (2.1-3.4cm)    LV SYSTOLIC FUNCTION BY 2D PLANIMETRY (MOD):  Normal Ranges:  EF-A4C View: 62.7 % (>=55%)  EF-A2C View: 45.9 %  EF-Biplane:  55.0 %    LV DIASTOLIC FUNCTION:  Normal Ranges:  MV Peak E:        0.80 m/s    (0.7-1.2 m/s)  MV Peak A:        0.77 m/s    (0.42-0.7 m/s)  E/A Ratio:        1.03        (1.0-2.2)  MV A Dur:         177.00 msec  PulmV Sys Gigi:    49.90 cm/s  PulmV Steele Gigi:   60.20 cm/s  PulmV S/D Gigi:    0.80  PulmV A Revs Gigi: 34.60 cm/s  PulmV A Revs Dur: 103.00 msec    MITRAL VALVE:  Normal Ranges:  MV Vmax:    0.95 m/s (<=1.3m/s)  MV peak PG: 3.6 mmHg (<5mmHg)  MV mean P.0 mmHg (<2mmHg)  MV DT:      277 msec (150-240msec)    AORTIC VALVE:  Normal Ranges:  AoV Vmax:      1.54 m/s (<=1.7m/s)  AoV Peak P.5 mmHg (<20mmHg)  LVOT Max Gigi:  1.19 m/s (<=1.1m/s)  LVOT VTI:      23.00 cm  LVOT Diameter: 1.90 cm  (1.8-2.4cm)  AoV Area,Vmax: 2.19 cm2 (2.5-4.5cm2)      RIGHT VENTRICLE:  RV Basal 2.98 cm  RV Mid   2.82 cm  RV Major 6.7 cm  TAPSE:   21.8 mm    TRICUSPID VALVE/RVSP:  Normal Ranges:  Peak TR Velocity: 3.67 m/s  RV Syst Pressure: 56.9 mmHg (<  30mmHg)    PULMONIC VALVE:  Normal Ranges:  PV Accel Time: 129 msec (>120ms)  PV Max Gigi:    1.0 m/s  (0.6-0.9m/s)  PV Max P.2 mmHg    Pulmonary Veins:  PulmV A Revs Dur: 103.00 msec  PulmV A Revs Gigi: 34.60 cm/s  PulmV Steele Gigi:   60.20 cm/s  PulmV S/D Gigi:    0.80  PulmV Sys Gigi:    49.90 cm/s      [id number] Jewel Barros MD  Electronically signed on 2024 at 2:46:05 PM        ** Final **       Cath:  2019  CONCLUSIONS:   1. No significant CAD with minimal luminal irregularities in a left dominant system.   2. Mildly elevated right sided filling pressures.   3. Normal left sided filling pressures.   4. Mild pulmonary hypertension.   5. No evidence of intracardiac shunt.   6. Preserved cardiac index.   7. No aortic stenosis.    Stress Test:  Stress Results:  No results found for this or any previous visit from the past 365 days.         Cardiac Imaging:        Assessment/Plan   Diagnoses and all orders for this visit:  Coronary arteriosclerosis  Essential (primary) hypertension  -     ECG 12 lead (Clinic Performed)  Dyspnea on exertion  -     Transthoracic echo (TTE) complete; Future  -     perflutren lipid microspheres (Definity) injection 0.5-10 mL of dilution  -     perflutren protein A microsphere (Optison) injection 0.5 mL  Hyperlipidemia, unspecified hyperlipidemia type  Tobacco use  -     Referral to Tobacco Cessation Counseling; Future    In summary Ms. Cosby is a pleasant 64 year-old female with a past medical history significant for mild coronary atherosclerosis with luminal irregularities on angiography, hypertension, hyperlipidemia, severe COPD with cor pulmonale and mild pulmonary hypertension on right heart catheterization, chronic tobacco use, chronic alcohol use, and a family history of premature coronary disease and sudden death.  She notes worsening dyspnea on exertion over the last year in the setting of severe COPD and continued tobacco use.  Her volume status is acceptable.  I did  reorder an echocardiogram to reassess her right ventricle and estimate her RVSP.  I strongly encouraged him to stop smoking and spent more than 3 minutes of time counseling her to do so.  I referred her to tobacco cessation.  I did instruct her to follow-up with pulmonology and her primary physician regarding her anemia.  She should continue her current cardiovascular medications.  We will see her back in follow-up in 6 months.    Orders:  Orders Placed This Encounter   Procedures    Referral to Tobacco Cessation Counseling    ECG 12 lead (Clinic Performed)    Transthoracic echo (TTE) complete      Followup Appts:  Future Appointments   Date Time Provider Department [place]   12/12/2024 11:00 AM Rajesh Astorga MD [id number] [facility]   5/5/2025  3:00 PM Karlene Valenzuela MD [id number] [facility]           ____________________________________________________________  Jewel Barros MD  [facility]  [facility]

## 2024-11-20 NOTE — PATIENT INSTRUCTIONS
Stop smoking.  Tobacco cessation referral.  Check echocardiogram.  Follow-up with pulmonology and your primary physician.  Follow-up in 6 months.

## 2024-11-21 DIAGNOSIS — D64.9 ANEMIA, UNSPECIFIED TYPE: Primary | ICD-10-CM

## 2024-11-26 ENCOUNTER — HOSPITAL ENCOUNTER (OUTPATIENT)
Dept: CARDIOLOGY | Facility: HOSPITAL | Age: 64
Discharge: HOME | End: 2024-11-26
Payer: MEDICARE

## 2024-11-26 DIAGNOSIS — R06.09 DYSPNEA ON EXERTION: ICD-10-CM

## 2024-11-26 DIAGNOSIS — I36.1 NONRHEUMATIC TRICUSPID (VALVE) INSUFFICIENCY: ICD-10-CM

## 2024-11-26 DIAGNOSIS — I27.0 PRIMARY PULMONARY HYPERTENSION (MULTI): ICD-10-CM

## 2024-11-26 PROCEDURE — 93306 TTE W/DOPPLER COMPLETE: CPT | Performed by: INTERNAL MEDICINE

## 2024-11-26 PROCEDURE — 93306 TTE W/DOPPLER COMPLETE: CPT

## 2024-11-27 LAB
AORTIC VALVE MEAN GRADIENT: 2 MMHG
AORTIC VALVE PEAK VELOCITY: 1.13 M/S
AV PEAK GRADIENT: 5 MMHG
AVA (PEAK VEL): 4.01 CM2
AVA (VTI): 3.12 CM2
EJECTION FRACTION APICAL 4 CHAMBER: 63.6
EJECTION FRACTION: 63 %
LEFT ATRIUM VOLUME AREA LENGTH INDEX BSA: 23.5 ML/M2
LEFT VENTRICLE INTERNAL DIMENSION DIASTOLE: 4.13 CM (ref 3.5–6)
LEFT VENTRICULAR OUTFLOW TRACT DIAMETER: 2.2 CM
MITRAL VALVE E/A RATIO: 0.86
RIGHT VENTRICLE FREE WALL PEAK S': 12.4 CM/S
RIGHT VENTRICLE PEAK SYSTOLIC PRESSURE: 39.2 MMHG
TRICUSPID ANNULAR PLANE SYSTOLIC EXCURSION: 2.2 CM

## 2024-12-12 ENCOUNTER — OFFICE VISIT (OUTPATIENT)
Dept: PULMONOLOGY | Facility: CLINIC | Age: 64
End: 2024-12-12
Payer: MEDICARE

## 2024-12-12 VITALS
OXYGEN SATURATION: 87 % | HEART RATE: 88 BPM | SYSTOLIC BLOOD PRESSURE: 158 MMHG | RESPIRATION RATE: 20 BRPM | HEIGHT: 66 IN | DIASTOLIC BLOOD PRESSURE: 67 MMHG | BODY MASS INDEX: 25.71 KG/M2 | WEIGHT: 160 LBS

## 2024-12-12 DIAGNOSIS — J96.11 CHRONIC RESPIRATORY FAILURE WITH HYPOXIA AND HYPERCAPNIA (MULTI): Primary | ICD-10-CM

## 2024-12-12 DIAGNOSIS — I27.20 PULMONARY HYPERTENSION, MILD (MULTI): ICD-10-CM

## 2024-12-12 DIAGNOSIS — F17.219 CIGARETTE NICOTINE DEPENDENCE WITH NICOTINE-INDUCED DISORDER: ICD-10-CM

## 2024-12-12 DIAGNOSIS — J96.12 CHRONIC RESPIRATORY FAILURE WITH HYPOXIA AND HYPERCAPNIA (MULTI): Primary | ICD-10-CM

## 2024-12-12 DIAGNOSIS — J44.1 COPD EXACERBATION (MULTI): ICD-10-CM

## 2024-12-12 PROCEDURE — 1126F AMNT PAIN NOTED NONE PRSNT: CPT | Performed by: INTERNAL MEDICINE

## 2024-12-12 PROCEDURE — 1036F TOBACCO NON-USER: CPT | Performed by: INTERNAL MEDICINE

## 2024-12-12 PROCEDURE — 1159F MED LIST DOCD IN RCRD: CPT | Performed by: INTERNAL MEDICINE

## 2024-12-12 PROCEDURE — 99215 OFFICE O/P EST HI 40 MIN: CPT | Performed by: INTERNAL MEDICINE

## 2024-12-12 PROCEDURE — 3008F BODY MASS INDEX DOCD: CPT | Performed by: INTERNAL MEDICINE

## 2024-12-12 PROCEDURE — 3077F SYST BP >= 140 MM HG: CPT | Performed by: INTERNAL MEDICINE

## 2024-12-12 PROCEDURE — 1160F RVW MEDS BY RX/DR IN RCRD: CPT | Performed by: INTERNAL MEDICINE

## 2024-12-12 PROCEDURE — 3078F DIAST BP <80 MM HG: CPT | Performed by: INTERNAL MEDICINE

## 2024-12-12 PROCEDURE — G2211 COMPLEX E/M VISIT ADD ON: HCPCS | Performed by: INTERNAL MEDICINE

## 2024-12-12 ASSESSMENT — ENCOUNTER SYMPTOMS
EYE PAIN: 0
APPETITE CHANGE: 0
WOUND: 0
SINUS PAIN: 0
HEADACHES: 0
LIGHT-HEADEDNESS: 0
MYALGIAS: 0
PALPITATIONS: 0
CONFUSION: 0
UNEXPECTED WEIGHT CHANGE: 1
ARTHRALGIAS: 0
DIZZINESS: 0
NAUSEA: 0
CONSTIPATION: 0
CHEST TIGHTNESS: 0
SHORTNESS OF BREATH: 1
RHINORRHEA: 1
ABDOMINAL PAIN: 0
CHILLS: 0
DYSPHORIC MOOD: 1
SEIZURES: 0
FEVER: 0
SINUS PRESSURE: 0
FREQUENCY: 1
VOMITING: 0
NECK PAIN: 0
COUGH: 1
FATIGUE: 1
SORE THROAT: 0
DYSURIA: 0
BACK PAIN: 0
EYE REDNESS: 0
DIARRHEA: 0
EYE ITCHING: 0
HEMATURIA: 0
NERVOUS/ANXIOUS: 1
WHEEZING: 1

## 2024-12-12 ASSESSMENT — PAIN SCALES - GENERAL: PAINLEVEL_OUTOF10: 0-NO PAIN

## 2024-12-12 NOTE — PATIENT INSTRUCTIONS
Ms Cosby,      It was a pleasure to see you in the clinic today. We discussed the followings:     1. COPD: your breathing test showed that you have a very severe case of COPD. Your symptoms seems to not be well controlled. For that reason I changed your Anoro to a new inhaler called Trelegy. Please continue albuterol as needed.      2. Hypoxia: please continue to use your oxygen at all times.     3. Smoking: please make every effort to stop smoking.      Please return to the clinic in 6 months.

## 2024-12-12 NOTE — PROGRESS NOTES
Subjective   Patient ID: Ilsa Cosby is a 64 y.o. female who presents for No chief complaint on file..  HPI  Patient with h/o COPD and CHF being seen for COPD management.   Last seen 7 months ago, no major events since then.   Overall feels worse than the last visit.   In the past, stated her employer is not allowing her to use her portable O2 at work due to safety issues. As a result is experiencing significant desaturation at work. But now she is not working. Currently on 5L NC.   Denies SOB at rest. ARECHIGA after walking 40 feet, or climbing 1/2 FOS. +ve rare orthopnea. No PND. No LE edema. ARECHIGA gradual onset, over years, progressive, and worse than the last visit. Denies associated CP, wheezing.   Very rare wheezing.  +ve chronic cough, mild, productive of clear sputum. No h/o hemoptysis. Cough and sputum now improved/stable     +ve snoring, no witness apnea. Does not feels rested in am.     Exposures: still smokes, 1 cigarettes a day, quit smoking in 10/2023, 1/20 PPD (cutting back) 1 pack/day x 30 years h/o smoking. No other exposures.   Current Outpatient Medications   Medication Instructions    albuterol 90 mcg/actuation inhaler 2 puffs, inhalation, Every 4 hours PRN    atorvastatin (LIPITOR) 40 mg, oral, Daily    carvedilol (COREG) 6.25 mg, oral, 2 times daily    ferrous sulfate 65 mg, Daily with breakfast    fluticasone-umeclidin-vilanter (Trelegy Ellipta) 200-62.5-25 mcg blister with device 1 puff, inhalation, Daily   Review of Systems   Constitutional:  Positive for fatigue and unexpected weight change (weight gain). Negative for appetite change, chills and fever.   HENT:  Positive for congestion, postnasal drip and rhinorrhea. Negative for sinus pressure, sinus pain and sore throat.    Eyes:  Negative for pain, redness, itching and visual disturbance.   Respiratory:  Positive for cough, shortness of breath and wheezing. Negative for chest tightness.    Cardiovascular:  Negative for chest pain, palpitations  and leg swelling.   Gastrointestinal:  Negative for abdominal pain, constipation, diarrhea, nausea and vomiting.   Genitourinary:  Positive for frequency. Negative for dysuria and hematuria.   Musculoskeletal:  Negative for arthralgias, back pain, myalgias and neck pain.   Skin:  Negative for pallor, rash and wound.   Neurological:  Negative for dizziness, seizures, syncope, light-headedness and headaches.   Psychiatric/Behavioral:  Positive for dysphoric mood. Negative for confusion. The patient is nervous/anxious.    Objective   There were no vitals taken for this visit.  Physical Exam  Constitutional:       General: She is not in acute distress.     Appearance: Normal appearance. She is normal weight. She is not ill-appearing.   HENT:      Head: Normocephalic and atraumatic.      Nose:      Comments: On 5 L NC.      Mouth/Throat:      Mouth: Mucous membranes are moist.      Comments: Mallampati 3.   Eyes:      General: No scleral icterus.     Extraocular Movements: Extraocular movements intact.      Pupils: Pupils are equal, round, and reactive to light.   Cardiovascular:      Rate and Rhythm: Normal rate and regular rhythm.      Heart sounds: Normal heart sounds. No murmur heard.     No friction rub. No gallop.   Pulmonary:      Effort: Pulmonary effort is normal. No respiratory distress.      Breath sounds: No wheezing or rales.      Comments: Clear lung fields b/l with fair/poor air entry.   Abdominal:      General: Bowel sounds are normal. There is no distension.      Palpations: Abdomen is soft.      Tenderness: There is no abdominal tenderness.   Musculoskeletal:         General: No swelling. Normal range of motion.      Cervical back: Neck supple. No rigidity or tenderness.      Right lower leg: No edema.      Left lower leg: No edema.   Lymphadenopathy:      Cervical: No cervical adenopathy.   Skin:     General: Skin is warm and dry.      Coloration: Skin is not jaundiced.   Neurological:      General: No  focal deficit present.      Mental Status: She is alert and oriented to person, place, and time.      Cranial Nerves: No cranial nerve deficit.      Motor: No weakness.   Psychiatric:         Mood and Affect: Mood normal.         Behavior: Behavior normal.     Results/Data  CAT from today reviewed, today's score 27.   Echo from 11/2024 reviewed that showed: LV EF 65%, normal RV, RVSF. RVSP 39.      The followings reviewed during the previous visits.      Echo from 11/2023 reviewed that showed: HFpEF, normal RV, RVSF, RVSP 57.      6 MWT result from 5/2023 reviewed. Walked 262 m (51%), required 4 L both at rest and with activity.   I personally reviewed the images for the chest X-ray from 6/9/2021, official read as below.   Echo result from 3/2021 reviewed that showed: HFpEF, normal RV and RVSF, RVSP 67. +ve PFO.   I personally reviewed the images for the chest X-ray from 11/2020 that showed small b/l pleural effusion.  Echo result from Sep 2019 reviewed that showed HFpEF, enlarged RV, normal RVSF.   I personally reviewed the images for the chest CT from 5/10/19 that showed b/l effusion, R>L  Echo from 5/9/ also reviewed that showed RVSP 40, severely dilated RV.   I personally interpreted the PFT from 4/2019 that showed FEV1/FVC 51%, FEV1 27%, RV/TLC 79%, decreased DLCO.   Assessment/Plan   63 YOF with recently diagnosed COPD, hypoxia, CHF now being seen for follow up.      1. COPD: GOLD 4, category B. CAT 27. No recent hospitalization. Symptoms improved with Anoro and then Trelegy. Now symptoms are getting worse. ? Due to weight gain, or worsening COPD control       -Continue Trelegy   -Continue ProAir as needed, on average 4-5 a week.    -Went to pulmonary rehab. Advised to continue the exercises.      2. Hypoxia: qualified for home O2. Currently on 5L. +ve PFO on Echo from 3/2021. Recent 6 MWT confirmed need for 4L.    -advised to use her O2 all the time     3. Pulmonary HTN and core pulmonale: likely group 2 and  3 from L sided HF and hypoxia. Recent echo improved RVSP   -Treat COPD and hypoxia as above   -CHF management as per cardiology     4. Smoking: quit 10/2023, but now smokes again.    -not interested to have screening yearly chest CT done.    -smoking cessation counseling done.      RTC in 6 months.

## 2025-01-22 DIAGNOSIS — R09.89 LABILE HYPERTENSION: ICD-10-CM

## 2025-01-22 RX ORDER — CARVEDILOL 6.25 MG/1
6.25 TABLET ORAL 2 TIMES DAILY
Qty: 180 TABLET | Refills: 3 | Status: SHIPPED | OUTPATIENT
Start: 2025-01-22 | End: 2026-01-22

## 2025-02-18 ENCOUNTER — APPOINTMENT (OUTPATIENT)
Dept: HEMATOLOGY/ONCOLOGY | Facility: CLINIC | Age: 65
End: 2025-02-18
Payer: MEDICARE

## 2025-05-05 ENCOUNTER — APPOINTMENT (OUTPATIENT)
Dept: PRIMARY CARE | Facility: CLINIC | Age: 65
End: 2025-05-05
Payer: COMMERCIAL

## 2025-05-05 VITALS
SYSTOLIC BLOOD PRESSURE: 143 MMHG | DIASTOLIC BLOOD PRESSURE: 74 MMHG | HEART RATE: 92 BPM | OXYGEN SATURATION: 92 % | TEMPERATURE: 98 F | WEIGHT: 167 LBS | BODY MASS INDEX: 26.84 KG/M2 | HEIGHT: 66 IN

## 2025-05-05 DIAGNOSIS — I50.33 ACUTE ON CHRONIC HEART FAILURE WITH PRESERVED EJECTION FRACTION: ICD-10-CM

## 2025-05-05 DIAGNOSIS — J44.1 COPD EXACERBATION (MULTI): ICD-10-CM

## 2025-05-05 DIAGNOSIS — J96.11 CHRONIC RESPIRATORY FAILURE WITH HYPOXIA AND HYPERCAPNIA: Primary | ICD-10-CM

## 2025-05-05 DIAGNOSIS — J44.89 OTHER SPECIFIED CHRONIC OBSTRUCTIVE PULMONARY DISEASE: ICD-10-CM

## 2025-05-05 DIAGNOSIS — J96.12 CHRONIC RESPIRATORY FAILURE WITH HYPOXIA AND HYPERCAPNIA: Primary | ICD-10-CM
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
## 2025-05-05 PROCEDURE — 1158F ADVNC CARE PLAN TLK DOCD: CPT | Performed by: INTERNAL MEDICINE

## 2025-05-05 PROCEDURE — 3077F SYST BP >= 140 MM HG: CPT | Performed by: INTERNAL MEDICINE

## 2025-05-05 PROCEDURE — 1123F ACP DISCUSS/DSCN MKR DOCD: CPT | Performed by: INTERNAL MEDICINE

## 2025-05-05 PROCEDURE — 1159F MED LIST DOCD IN RCRD: CPT | Performed by: INTERNAL MEDICINE

## 2025-05-05 PROCEDURE — 1036F TOBACCO NON-USER: CPT | Performed by: INTERNAL MEDICINE

## 2025-05-05 PROCEDURE — 3078F DIAST BP <80 MM HG: CPT | Performed by: INTERNAL MEDICINE

## 2025-05-05 PROCEDURE — 91320 SARSCV2 VAC 30MCG TRS-SUC IM: CPT | Performed by: INTERNAL MEDICINE

## 2025-05-05 PROCEDURE — 90480 ADMN SARSCOV2 VAC 1/ONLY CMP: CPT | Performed by: INTERNAL MEDICINE

## 2025-05-05 PROCEDURE — 99214 OFFICE O/P EST MOD 30 MIN: CPT | Performed by: INTERNAL MEDICINE

## 2025-05-05 PROCEDURE — 3008F BODY MASS INDEX DOCD: CPT | Performed by: INTERNAL MEDICINE

## 2025-05-05 ASSESSMENT — ANXIETY QUESTIONNAIRES
5. BEING SO RESTLESS THAT IT IS HARD TO SIT STILL: NOT AT ALL
GAD7 TOTAL SCORE: 3
3. WORRYING TOO MUCH ABOUT DIFFERENT THINGS: SEVERAL DAYS
IF YOU CHECKED OFF ANY PROBLEMS ON THIS QUESTIONNAIRE, HOW DIFFICULT HAVE THESE PROBLEMS MADE IT FOR YOU TO DO YOUR WORK, TAKE CARE OF THINGS AT HOME, OR GET ALONG WITH OTHER PEOPLE: NOT DIFFICULT AT ALL
2. NOT BEING ABLE TO STOP OR CONTROL WORRYING: SEVERAL DAYS
7. FEELING AFRAID AS IF SOMETHING AWFUL MIGHT HAPPEN: NOT AT ALL
4. TROUBLE RELAXING: NOT AT ALL
6. BECOMING EASILY ANNOYED OR IRRITABLE: NOT AT ALL
1. FEELING NERVOUS, ANXIOUS, OR ON EDGE: SEVERAL DAYS

## 2025-05-05 ASSESSMENT — PATIENT HEALTH QUESTIONNAIRE - PHQ9
1. LITTLE INTEREST OR PLEASURE IN DOING THINGS: NOT AT ALL
SUM OF ALL RESPONSES TO PHQ QUESTIONS 1-9: 5
3. TROUBLE FALLING OR STAYING ASLEEP OR SLEEPING TOO MUCH: SEVERAL DAYS
2. FEELING DOWN, DEPRESSED OR HOPELESS: SEVERAL DAYS
7. TROUBLE CONCENTRATING ON THINGS, SUCH AS READING THE NEWSPAPER OR WATCHING TELEVISION: NOT AT ALL
8. MOVING OR SPEAKING SO SLOWLY THAT OTHER PEOPLE COULD HAVE NOTICED. OR THE OPPOSITE, BEING SO FIGETY OR RESTLESS THAT YOU HAVE BEEN MOVING AROUND A LOT MORE THAN USUAL: NOT AT ALL
SUM OF ALL RESPONSES TO PHQ9 QUESTIONS 1 AND 2: 1
5. POOR APPETITE OR OVEREATING: SEVERAL DAYS
10. IF YOU CHECKED OFF ANY PROBLEMS, HOW DIFFICULT HAVE THESE PROBLEMS MADE IT FOR YOU TO DO YOUR WORK, TAKE CARE OF THINGS AT HOME, OR GET ALONG WITH OTHER PEOPLE: SOMEWHAT DIFFICULT
9. THOUGHTS THAT YOU WOULD BE BETTER OFF DEAD, OR OF HURTING YOURSELF: NOT AT ALL
6. FEELING BAD ABOUT YOURSELF - OR THAT YOU ARE A FAILURE OR HAVE LET YOURSELF OR YOUR FAMILY DOWN: SEVERAL DAYS
4. FEELING TIRED OR HAVING LITTLE ENERGY: SEVERAL DAYS

## 2025-05-05 NOTE — PROGRESS NOTES
"PCP: Karlene Valenzuela MD   I have reviewed existing histories, notes, past medical history, surgical history, social history, family history, med list, allergy list, and immunization, and updated.    HPI:   Pt has medicare now, but will schedule welcome medicare visit for a future time  Pt has COPD sees pulmonologist  CHF, sees cardiologist   Due for labs  She agrees for  pharmacy program for COPD    Lab Results   Component Value Date    WBC 4.7 07/03/2024    HGB 10.2 (L) 07/03/2024    HCT 32.1 (L) 07/03/2024     (L) 07/03/2024    CHOL 186 07/03/2024    TRIG 66 07/03/2024    HDL 91.3 07/03/2024    ALT 44 07/03/2024    AST 47 (H) 07/03/2024     07/03/2024    K 5.0 07/03/2024    CL 96 (L) 07/03/2024    CREATININE 0.84 07/03/2024    BUN 23 07/03/2024    CO2 38 (H) 07/03/2024    TSH 1.16 05/08/2019    INR 1.0 05/08/2019    HGBA1C 5.0 07/03/2024     Physical exam:  /74   Pulse 92   Temp 36.7 °C (98 °F)   Ht 1.664 m (5' 5.5\")   Wt 75.8 kg (167 lb)   SpO2 92% Comment: 5 LT  BMI 27.37 kg/m²    Heart RR  Lungs decreased bs  No Abdominal pain  No leg edema.      Assessments/orders:   Assessment & Plan  Chronic respiratory failure with hypoxia and hypercapnia    Orders:    Referral to Clinical Pharmacy; Future    Acute on chronic heart failure with preserved ejection fraction    Orders:    CBC; Future    Comprehensive Metabolic Panel; Future    Lipid Panel; Future    COPD exacerbation (Multi)         Other specified chronic obstructive pulmonary disease           Covid booster  Schedule welcome to medicare               "

## 2025-05-13 ENCOUNTER — APPOINTMENT (OUTPATIENT)
Dept: PHARMACY | Facility: HOSPITAL | Age: 65
End: 2025-05-13
Payer: MEDICARE

## 2025-05-15 ENCOUNTER — TELEMEDICINE (OUTPATIENT)
Dept: PHARMACY | Facility: HOSPITAL | Age: 65
End: 2025-05-15
Payer: MEDICARE

## 2025-05-15 DIAGNOSIS — J96.11 CHRONIC RESPIRATORY FAILURE WITH HYPOXIA AND HYPERCAPNIA: ICD-10-CM

## 2025-05-15 DIAGNOSIS — J96.12 CHRONIC RESPIRATORY FAILURE WITH HYPOXIA AND HYPERCAPNIA: ICD-10-CM

## 2025-05-15 NOTE — PROGRESS NOTES
"  Clinical Pharmacy Appointment    Patient ID: Ilsa Cosby is a 64 y.o. female who presents for COPD.    Pt is here for First appointment.     Referring Provider: Karlene Valenzuela MD  PCP: Karlene Valenzuela MD  Last visit with PCP: 5/5/25   Next visit with PCP: 8/5/25    Subjective     DISCUSSION  Patient is NOT interested in speaking to pharmacy team   Tried to evaluate if inhalers were affordable   Patient is sure she does not qualify for any patient assistance programs and declined to discuss further       Objective   Allergies[1]  Social History     Social History Narrative    Not on file      Medication Review  Current Outpatient Medications   Medication Instructions    albuterol 90 mcg/actuation inhaler 2 puffs, inhalation, Every 4 hours PRN    atorvastatin (LIPITOR) 40 mg, oral, Daily    carvedilol (COREG) 6.25 mg, oral, 2 times daily    ferrous sulfate 65 mg, Daily with breakfast    fluticasone-umeclidin-vilanter (Trelegy Ellipta) 200-62.5-25 mcg blister with device 1 puff, inhalation, Daily      Vitals  BP Readings from Last 2 Encounters:   05/05/25 143/74   12/12/24 158/67     BMI Readings from Last 1 Encounters:   05/05/25 27.37 kg/m²      Labs  A1C  Lab Results   Component Value Date    HGBA1C 5.0 07/03/2024    HGBA1C 5.1 01/11/2023     BMP  Lab Results   Component Value Date    CALCIUM 9.6 07/03/2024     07/03/2024    K 5.0 07/03/2024    CO2 38 (H) 07/03/2024    CL 96 (L) 07/03/2024    BUN 23 07/03/2024    CREATININE 0.84 07/03/2024    EGFR 78 07/03/2024     LFTs  Lab Results   Component Value Date    ALT 44 07/03/2024    AST 47 (H) 07/03/2024    ALKPHOS 67 07/03/2024    BILITOT 0.4 07/03/2024     FLP  Lab Results   Component Value Date    TRIG 66 07/03/2024    CHOL 186 07/03/2024    LDLF 63 01/11/2023    LDLCALC 82 07/03/2024    HDL 91.3 07/03/2024     Urine Microalbumin  No results found for: \"MICROALBCREA\"  Weight Management  Wt Readings from Last 3 Encounters:   05/05/25 75.8 kg (167 lb)   12/12/24 " 72.6 kg (160 lb)   11/20/24 73.2 kg (161 lb 4.8 oz)      There is no height or weight on file to calculate BMI.     Assessment/Plan   Problem List Items Addressed This Visit       Chronic respiratory failure with hypoxia and hypercapnia   CONTINUE all medications as prescribed     Clinical Pharmacist follow-up: n/a    Continue all meds under the continuation of care with the referring provider and clinical pharmacy team.         [1] No Known Allergies

## 2025-05-17 LAB
ALBUMIN SERPL-MCNC: 4.7 G/DL (ref 3.6–5.1)
ALP SERPL-CCNC: 78 U/L (ref 37–153)
ALT SERPL-CCNC: 20 U/L (ref 6–29)
ANION GAP SERPL CALCULATED.4IONS-SCNC: 11 MMOL/L (CALC) (ref 7–17)
AST SERPL-CCNC: 24 U/L (ref 10–35)
BILIRUB SERPL-MCNC: 0.5 MG/DL (ref 0.2–1.2)
BUN SERPL-MCNC: 18 MG/DL (ref 7–25)
CALCIUM SERPL-MCNC: 9.4 MG/DL (ref 8.6–10.4)
CHLORIDE SERPL-SCNC: 97 MMOL/L (ref 98–110)
CHOLEST SERPL-MCNC: 187 MG/DL
CHOLEST/HDLC SERPL: 1.9 (CALC)
CO2 SERPL-SCNC: 34 MMOL/L (ref 20–32)
CREAT SERPL-MCNC: 0.69 MG/DL (ref 0.5–1.05)
EGFRCR SERPLBLD CKD-EPI 2021: 96 ML/MIN/1.73M2
ERYTHROCYTE [DISTWIDTH] IN BLOOD BY AUTOMATED COUNT: 14.8 % (ref 11–15)
GLUCOSE SERPL-MCNC: 75 MG/DL (ref 65–99)
HCT VFR BLD AUTO: 30 % (ref 35–45)
HDLC SERPL-MCNC: 98 MG/DL
HGB BLD-MCNC: 9.7 G/DL (ref 11.7–15.5)
LDLC SERPL CALC-MCNC: 76 MG/DL (CALC)
MCH RBC QN AUTO: 34.9 PG (ref 27–33)
MCHC RBC AUTO-ENTMCNC: 32.3 G/DL (ref 32–36)
MCV RBC AUTO: 107.9 FL (ref 80–100)
NONHDLC SERPL-MCNC: 89 MG/DL (CALC)
PLATELET # BLD AUTO: 196 THOUSAND/UL (ref 140–400)
PMV BLD REES-ECKER: 10.9 FL (ref 7.5–12.5)
POTASSIUM SERPL-SCNC: 5 MMOL/L (ref 3.5–5.3)
PROT SERPL-MCNC: 7.1 G/DL (ref 6.1–8.1)
RBC # BLD AUTO: 2.78 MILLION/UL (ref 3.8–5.1)
SODIUM SERPL-SCNC: 142 MMOL/L (ref 135–146)
TRIGL SERPL-MCNC: 54 MG/DL
WBC # BLD AUTO: 5.9 THOUSAND/UL (ref 3.8–10.8)

## 2025-05-18 DIAGNOSIS — D64.9 ANEMIA, UNSPECIFIED TYPE: Primary | ICD-10-CM

## 2025-05-21 ENCOUNTER — OFFICE VISIT (OUTPATIENT)
Dept: CARDIOLOGY | Facility: HOSPITAL | Age: 65
End: 2025-05-21
Payer: MEDICARE

## 2025-05-21 VITALS
DIASTOLIC BLOOD PRESSURE: 68 MMHG | RESPIRATION RATE: 19 BRPM | SYSTOLIC BLOOD PRESSURE: 128 MMHG | HEIGHT: 66 IN | OXYGEN SATURATION: 92 % | WEIGHT: 167 LBS | HEART RATE: 74 BPM | BODY MASS INDEX: 26.84 KG/M2

## 2025-05-21 DIAGNOSIS — R06.09 DYSPNEA ON EXERTION: Primary | ICD-10-CM

## 2025-05-21 DIAGNOSIS — E78.5 HYPERLIPIDEMIA, UNSPECIFIED HYPERLIPIDEMIA TYPE: ICD-10-CM

## 2025-05-21 DIAGNOSIS — I10 ESSENTIAL (PRIMARY) HYPERTENSION: ICD-10-CM

## 2025-05-21 DIAGNOSIS — I25.10 CORONARY ARTERIOSCLEROSIS: ICD-10-CM

## 2025-05-21 PROCEDURE — 3074F SYST BP LT 130 MM HG: CPT | Performed by: NURSE PRACTITIONER

## 2025-05-21 PROCEDURE — 1159F MED LIST DOCD IN RCRD: CPT | Performed by: NURSE PRACTITIONER

## 2025-05-21 PROCEDURE — 3008F BODY MASS INDEX DOCD: CPT | Performed by: NURSE PRACTITIONER

## 2025-05-21 PROCEDURE — 1160F RVW MEDS BY RX/DR IN RCRD: CPT | Performed by: NURSE PRACTITIONER

## 2025-05-21 PROCEDURE — 99214 OFFICE O/P EST MOD 30 MIN: CPT | Performed by: NURSE PRACTITIONER

## 2025-05-21 PROCEDURE — G2211 COMPLEX E/M VISIT ADD ON: HCPCS | Performed by: NURSE PRACTITIONER

## 2025-05-21 PROCEDURE — 99212 OFFICE O/P EST SF 10 MIN: CPT | Performed by: NURSE PRACTITIONER

## 2025-05-21 PROCEDURE — 3078F DIAST BP <80 MM HG: CPT | Performed by: NURSE PRACTITIONER

## 2025-05-21 PROCEDURE — 1036F TOBACCO NON-USER: CPT | Performed by: NURSE PRACTITIONER

## 2025-05-21 NOTE — PATIENT INSTRUCTIONS
Continue current meds  Follow up in 6 months  Follow up with pulmonology  Increase activity as able

## 2025-05-21 NOTE — PROGRESS NOTES
[facility] Care Physician: Karlene Valenzuela MD  Date of Visit: 05/21/2025  1:30 PM EDT  Location of visit: [facility]     Chief Complaint:   Chief Complaint   Patient presents with    Follow-up        HPI / Summary:   Ilsa Cosby is a 64 y.o. female presents for followup. Seen in collaboration with Dr. Barros. Her chronic dyspnea on exertion has continued to gradually get worse. She has not been physically active. She is able to ambulate up and down one flight of stairs without chest pain. She wear supplemental oxygen 4 liters nasal cannula most of the time. She will increase to 5 liters with activity. She denies smoking. The patient denies chest pain, palpitations, lightheadedness, syncope, orthopnea, paroxysmal nocturnal dyspnea, lower extremity edema, or bleeding problems.              Past Medical History:  Past Medical History:   Diagnosis Date    Acute on chronic diastolic (congestive) heart failure 05/23/2019    Acute on chronic heart failure with preserved ejection fraction    Chronic obstructive pulmonary disease with (acute) exacerbation (Multi) 11/24/2020    COPD exacerbation    Encounter for screening for other viral diseases 03/05/2019    Need for hepatitis C screening test    Localized edema 05/08/2019    Lower extremity edema    Localized edema 03/05/2019    Periorbital edema    Other malaise 11/18/2021    Physical deconditioning    Personal history of other diseases of the circulatory system 06/03/2019    History of congestive heart failure    Personal history of other diseases of the nervous system and sense organs 03/05/2019    History of tremor    Personal history of other diseases of the respiratory system 06/03/2019    History of chronic obstructive lung disease    Personal history of other diseases of the respiratory system 08/14/2020    History of acute bronchitis with bronchospasm        Past Surgical History:  Past Surgical History:   Procedure Laterality Date    OTHER SURGICAL HISTORY   "06/03/2019    No history of surgery          Social History:   reports that she quit smoking about 8 months ago. Her smoking use included cigarettes. She has never used smokeless tobacco. She reports current alcohol use of about 2.0 standard drinks of alcohol per week. She reports that she does not use drugs.     Family History:  family history includes Diabetes in her paternal grandfather; Myocardial infarction in her brother and father; Stroke in her maternal grandmother, mother's brother, and mother's sister; cardiac disorder in her father.      Allergies:  No Known Allergies    Outpatient Medications:  Current Outpatient Medications   Medication Instructions    albuterol 90 mcg/actuation inhaler 2 puffs, inhalation, Every 4 hours PRN    atorvastatin (LIPITOR) 40 mg, oral, Daily    carvedilol (COREG) 6.25 mg, oral, 2 times daily    ferrous sulfate 65 mg, Daily with breakfast    fluticasone-umeclidin-vilanter (Trelegy Ellipta) 200-62.5-25 mcg blister with device 1 puff, inhalation, Daily       Physical Exam:  Vitals:    05/21/25 1327   BP: 128/68   BP Location: Left arm   Patient Position: Sitting   BP Cuff Size: Adult   Pulse: 74   Resp: 19   SpO2: 92%   Weight: 75.8 kg (167 lb)   Height: 1.676 m (5' 6\")     Wt Readings from Last 5 Encounters:   05/21/25 75.8 kg (167 lb)   05/05/25 75.8 kg (167 lb)   12/12/24 72.6 kg (160 lb)   11/20/24 73.2 kg (161 lb 4.8 oz)   07/03/24 72.6 kg (160 lb)     Body mass index is 26.95 kg/m².     GENERAL: alert, cooperative, pleasant, in no acute distress  SKIN: warm and dry  NECK: Normal JVD, negative HJR  CARDIAC: Regular rate and rhythm with no rubs, murmurs, or gallops  CHEST: Normal respiratory efforts, except diminished lung sounds posteriorly throughout all lung fields.  ABDOMEN: soft, nontender, nondistended  EXTREMITIES: Trivial ankle edema bilaterally, +2 palpable RP bilaterally       Last Labs:  Recent Labs     05/16/25  1424 07/03/24  1451 01/12/23  1524   WBC 5.9 4.7 " 7.3   HGB 9.7* 10.2* 10.0*   HCT 30.0* 32.1* 32.1*    139* 206   .9* 106* 108*     Recent Labs     05/16/25  1424 07/03/24  1451 01/11/23  1319    142 143   K 5.0 5.0 4.8   CL 97* 96* 97*   BUN 18 23 25*   CREATININE 0.69 0.84 0.80     CMP -  Lab Results   Component Value Date    CALCIUM 9.4 05/16/2025    PHOS 4.7 05/10/2019    PROT 7.1 05/16/2025    ALBUMIN 4.7 05/16/2025    AST 24 05/16/2025    ALT 20 05/16/2025    ALKPHOS 78 05/16/2025    BILITOT 0.5 05/16/2025       LIPID PANEL -   Lab Results   Component Value Date    CHOL 187 05/16/2025    HDL 98 05/16/2025    LDLF 63 01/11/2023    TRIG 54 05/16/2025   LDL 76 5/16/25    Lab Results   Component Value Date     (H) 06/09/2021    HGBA1C 5.0 07/03/2024       Last Cardiology Tests:  ECG:  Reviewed EKG from 11/20/24- normal sinus rhythm with PACs HR 71    Echo:  Echo Results:  11/26/24  CONCLUSIONS:   1. Left ventricular ejection fraction is normal, by visual estimate at 60-65%.   2. There is normal right ventricular global systolic function.   3. Mildly elevated right ventricular systolic pressure.    3/16/21  CONCLUSIONS:   1. The left ventricular systolic function is normal with a 65-70% estimated ejection fraction.   2. Spectral Doppler shows an impaired relaxation pattern of left ventricular diastolic filling.   3. Moderately elevated pulmonary artery pressure.   4. A bubble study using agitated saline was performed. Bubble study is positive.         Cath:  5/13/2019  CONCLUSIONS:   1. No significant CAD with minimal luminal irregularities in a left dominant system.   2. Mildly elevated right sided filling pressures.   3. Normal left sided filling pressures.   4. Mild pulmonary hypertension.   5. No evidence of intracardiac shunt.   6. Preserved cardiac index.   7. No aortic stenosis.             Assessment/Plan   Ilsa was seen today for follow-up.  Diagnoses and all orders for this visit:  Dyspnea on exertion (Primary)  Coronary  arteriosclerosis  Essential (primary) hypertension  Hyperlipidemia, unspecified hyperlipidemia type         In summary Ms. Cosby is a pleasant 64 year-old female with a past medical history significant for mild coronary atherosclerosis with luminal irregularities on angiography, severe COPD with cor pulmonale and mild pulmonary hypertension on right heart catheterization, chronic tobacco use, chronic alcohol use, and a family history of premature coronary disease and sudden death. Her dyspnea on exertion has gradually been getting worse over the past year. Her volume status is acceptable. Her blood pressure is controlled. I suspect her dyspnea on exertion is secondary to severe COPD and anemia may be contributing. She will continue current cardiovascular medications. She will follow up with pulmonary. She will follow up in 6 months. She will call sooner with questions or concerns.             Orders:  No orders of the defined types were placed in this encounter.     Followup Appts:  Future Appointments   Date Time Provider Department Center   8/5/2025  3:00 PM Karlene Valenzuela MD [id number]           ____________________________________________________________  Rebecca Valentin, APRN-CNP  [facility]  [facility]

## 2025-07-05 DIAGNOSIS — J44.9 CHRONIC OBSTRUCTIVE PULMONARY DISEASE, UNSPECIFIED COPD TYPE (MULTI): ICD-10-CM

## 2025-07-07 RX ORDER — FLUTICASONE FUROATE, UMECLIDINIUM BROMIDE AND VILANTEROL TRIFENATATE 200; 62.5; 25 UG/1; UG/1; UG/1
1 POWDER RESPIRATORY (INHALATION) DAILY
Qty: 28 EACH | Refills: 5 | Status: SHIPPED | OUTPATIENT
Start: 2025-07-07

## 2025-08-05 ENCOUNTER — APPOINTMENT (OUTPATIENT)
Dept: PRIMARY CARE | Facility: CLINIC | Age: 65
End: 2025-08-05
Payer: MEDICARE

## 2025-08-05 VITALS
SYSTOLIC BLOOD PRESSURE: 107 MMHG | WEIGHT: 167 LBS | TEMPERATURE: 97.9 F | HEART RATE: 83 BPM | HEIGHT: 66 IN | OXYGEN SATURATION: 93 % | BODY MASS INDEX: 26.84 KG/M2 | DIASTOLIC BLOOD PRESSURE: 63 MMHG

## 2025-08-05 DIAGNOSIS — D64.9 ANEMIA, UNSPECIFIED TYPE: ICD-10-CM

## 2025-08-05 DIAGNOSIS — Z12.11 COLON CANCER SCREENING: ICD-10-CM

## 2025-08-05 DIAGNOSIS — Z78.0 MENOPAUSE: ICD-10-CM

## 2025-08-05 DIAGNOSIS — Z00.00 MEDICARE WELCOME EXAM: Primary | ICD-10-CM

## 2025-08-05 DIAGNOSIS — Z12.31 ENCOUNTER FOR SCREENING MAMMOGRAM FOR MALIGNANT NEOPLASM OF BREAST: ICD-10-CM

## 2025-08-05 PROBLEM — R25.1 TREMOR, UNSPECIFIED: Status: RESOLVED | Noted: 2023-09-07 | Resolved: 2025-08-05

## 2025-08-05 PROCEDURE — 1159F MED LIST DOCD IN RCRD: CPT | Performed by: INTERNAL MEDICINE

## 2025-08-05 PROCEDURE — 3074F SYST BP LT 130 MM HG: CPT | Performed by: INTERNAL MEDICINE

## 2025-08-05 PROCEDURE — 3078F DIAST BP <80 MM HG: CPT | Performed by: INTERNAL MEDICINE

## 2025-08-05 PROCEDURE — 3008F BODY MASS INDEX DOCD: CPT | Performed by: INTERNAL MEDICINE

## 2025-08-05 PROCEDURE — G0402 INITIAL PREVENTIVE EXAM: HCPCS | Performed by: INTERNAL MEDICINE

## 2025-08-05 PROCEDURE — 1170F FXNL STATUS ASSESSED: CPT | Performed by: INTERNAL MEDICINE

## 2025-08-05 ASSESSMENT — ACTIVITIES OF DAILY LIVING (ADL)
MANAGING_FINANCES: INDEPENDENT
DOING_HOUSEWORK: NEEDS ASSISTANCE
DRESSING: INDEPENDENT
BATHING: INDEPENDENT
TAKING_MEDICATION: INDEPENDENT
GROCERY_SHOPPING: NEEDS ASSISTANCE

## 2025-08-05 ASSESSMENT — ANXIETY QUESTIONNAIRES
3. WORRYING TOO MUCH ABOUT DIFFERENT THINGS: SEVERAL DAYS
2. NOT BEING ABLE TO STOP OR CONTROL WORRYING: SEVERAL DAYS
7. FEELING AFRAID AS IF SOMETHING AWFUL MIGHT HAPPEN: NOT AT ALL
4. TROUBLE RELAXING: NOT AT ALL
1. FEELING NERVOUS, ANXIOUS, OR ON EDGE: NOT AT ALL
6. BECOMING EASILY ANNOYED OR IRRITABLE: NOT AT ALL
GAD7 TOTAL SCORE: 2
5. BEING SO RESTLESS THAT IT IS HARD TO SIT STILL: NOT AT ALL
IF YOU CHECKED OFF ANY PROBLEMS ON THIS QUESTIONNAIRE, HOW DIFFICULT HAVE THESE PROBLEMS MADE IT FOR YOU TO DO YOUR WORK, TAKE CARE OF THINGS AT HOME, OR GET ALONG WITH OTHER PEOPLE: NOT DIFFICULT AT ALL

## 2025-08-05 ASSESSMENT — PATIENT HEALTH QUESTIONNAIRE - PHQ9
4. FEELING TIRED OR HAVING LITTLE ENERGY: MORE THAN HALF THE DAYS
5. POOR APPETITE OR OVEREATING: MORE THAN HALF THE DAYS
6. FEELING BAD ABOUT YOURSELF - OR THAT YOU ARE A FAILURE OR HAVE LET YOURSELF OR YOUR FAMILY DOWN: MORE THAN HALF THE DAYS
2. FEELING DOWN, DEPRESSED OR HOPELESS: MORE THAN HALF THE DAYS
SUM OF ALL RESPONSES TO PHQ QUESTIONS 1-9: 12
7. TROUBLE CONCENTRATING ON THINGS, SUCH AS READING THE NEWSPAPER OR WATCHING TELEVISION: NOT AT ALL
SUM OF ALL RESPONSES TO PHQ9 QUESTIONS 1 AND 2: 4
8. MOVING OR SPEAKING SO SLOWLY THAT OTHER PEOPLE COULD HAVE NOTICED. OR THE OPPOSITE, BEING SO FIGETY OR RESTLESS THAT YOU HAVE BEEN MOVING AROUND A LOT MORE THAN USUAL: NOT AT ALL
3. TROUBLE FALLING OR STAYING ASLEEP OR SLEEPING TOO MUCH: MORE THAN HALF THE DAYS
9. THOUGHTS THAT YOU WOULD BE BETTER OFF DEAD, OR OF HURTING YOURSELF: NOT AT ALL
1. LITTLE INTEREST OR PLEASURE IN DOING THINGS: MORE THAN HALF THE DAYS

## 2025-08-05 NOTE — PROGRESS NOTES
"Karlene Valenzuela MD  SUBJECTIVE:     Ilsa Cosby is a 64 y.o. female presenting for her annual physical/wellness.  Welcome to medicare visit  pt with COPD, on continuous 02, CHF, cor pulmonale, CAD, hyperlipidemia, htn, pulmonary htn, and heavy alcohol drinker. Ex smoker, anemia (unknown cause, she made appointment to see hematologist)    Colon screening: due. Cologuard ordered  Last pap: na  Last mammogram: she would like to skip this year  Dexa due. ordered  Vaccines Up to date with pneumonia vaccine, and shingrix. Will need tdap, RSV. Also to get flu and covid booster in the fall. She will take care of these in the fall.  Diet:   healthy in general    Lab Results   Component Value Date    HGBA1C 5.0 07/03/2024     Lab Results   Component Value Date    CREATININE 0.69 05/16/2025     Lab Results   Component Value Date    WBC 5.9 05/16/2025    HGB 9.7 (L) 05/16/2025    HCT 30.0 (L) 05/16/2025    .9 (H) 05/16/2025     05/16/2025     Lab Results   Component Value Date    CHOL 187 05/16/2025    CHOL 186 07/03/2024    CHOL 166 01/11/2023     Lab Results   Component Value Date    HDL 98 05/16/2025    HDL 91.3 07/03/2024    HDL 89.3 01/11/2023     Lab Results   Component Value Date    LDLCALC 76 05/16/2025    LDLCALC 82 07/03/2024     Lab Results   Component Value Date    TRIG 54 05/16/2025    TRIG 66 07/03/2024    TRIG 69 01/11/2023         ROS:  otherwise Feeling well.  No chest pain on exertion. No abdominal pain, change in bowel habits, black or bloody stools. No urinary tract or  symptoms.  No breast concerns. No neurological complaints.    OBJECTIVE:   The patient appears well, alert, oriented x 3, in no distress.   /63   Pulse 83   Temp 36.6 °C (97.9 °F)   Ht 1.676 m (5' 6\")   Wt 75.8 kg (167 lb)   SpO2 93%   BMI 26.95 kg/m²   ENT normal.  Neck supple. No adenopathy or thyromegaly. VANESSA. Lungs are clear, reduced air entry,  no wheezes, rhonchi or rales. S1 and S2 normal, no murmurs, " regular rate and rhythm. Abdomen is soft without tenderness, guarding, mass or organomegaly.  exam deferred to Gyn. Extremities show no edema, normal peripheral pulses. Neurological is normal without focal findings.    Assessment & Plan  Medicare welcome exam         Encounter for screening mammogram for malignant neoplasm of breast         Menopause    Orders:    XR DEXA bone density; Future    Anemia, unspecified type  Please keep the appointment to see hematologist       Colon cancer screening    Orders:    Cologuard® colon cancer screening; Future    Get tdap, RSV from drug store, also flu and covid booster in the fall.  Please see eye doctor for vision evaluation.  Keep the appointment to see Hematologist for work up of anemia.  Please reduce alcohol consumption